# Patient Record
Sex: FEMALE | Race: WHITE | NOT HISPANIC OR LATINO | URBAN - METROPOLITAN AREA
[De-identification: names, ages, dates, MRNs, and addresses within clinical notes are randomized per-mention and may not be internally consistent; named-entity substitution may affect disease eponyms.]

---

## 2017-01-13 ENCOUNTER — EMERGENCY (EMERGENCY)
Facility: HOSPITAL | Age: 77
LOS: 0 days | Discharge: HOME | End: 2017-01-13
Admitting: EMERGENCY MEDICINE
Payer: MEDICARE

## 2017-01-13 DIAGNOSIS — E78.5 HYPERLIPIDEMIA, UNSPECIFIED: ICD-10-CM

## 2017-01-13 DIAGNOSIS — N81.89 OTHER FEMALE GENITAL PROLAPSE: ICD-10-CM

## 2017-01-13 DIAGNOSIS — R30.0 DYSURIA: ICD-10-CM

## 2017-01-13 DIAGNOSIS — N39.0 URINARY TRACT INFECTION, SITE NOT SPECIFIED: ICD-10-CM

## 2017-01-13 DIAGNOSIS — N81.4 UTEROVAGINAL PROLAPSE, UNSPECIFIED: ICD-10-CM

## 2017-01-13 DIAGNOSIS — R10.9 UNSPECIFIED ABDOMINAL PAIN: ICD-10-CM

## 2017-01-13 PROCEDURE — 99285 EMERGENCY DEPT VISIT HI MDM: CPT

## 2017-01-14 ENCOUNTER — EMERGENCY (EMERGENCY)
Facility: HOSPITAL | Age: 77
LOS: 0 days | Discharge: HOME | End: 2017-01-14
Admitting: EMERGENCY MEDICINE
Payer: MEDICARE

## 2017-01-14 DIAGNOSIS — T83.028A DISPLACEMENT OF OTHER URINARY CATHETER, INITIAL ENCOUNTER: ICD-10-CM

## 2017-01-14 DIAGNOSIS — Z79.82 LONG TERM (CURRENT) USE OF ASPIRIN: ICD-10-CM

## 2017-01-14 DIAGNOSIS — N81.4 UTEROVAGINAL PROLAPSE, UNSPECIFIED: ICD-10-CM
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
## 2017-01-14 DIAGNOSIS — E78.5 HYPERLIPIDEMIA, UNSPECIFIED: ICD-10-CM
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
## 2017-01-14 PROCEDURE — 99284 EMERGENCY DEPT VISIT MOD MDM: CPT | Mod: 25

## 2017-01-15 ENCOUNTER — INPATIENT (INPATIENT)
Facility: HOSPITAL | Age: 77
LOS: 0 days | Discharge: HOME | End: 2017-01-16
Attending: OBSTETRICS & GYNECOLOGY | Admitting: OBSTETRICS & GYNECOLOGY
Payer: MEDICARE

## 2017-01-15 PROCEDURE — 99285 EMERGENCY DEPT VISIT HI MDM: CPT

## 2017-01-19 DIAGNOSIS — N81.3 COMPLETE UTEROVAGINAL PROLAPSE: ICD-10-CM

## 2017-01-23 ENCOUNTER — EMERGENCY (EMERGENCY)
Facility: HOSPITAL | Age: 77
LOS: 0 days | Discharge: HOME | End: 2017-01-23
Admitting: EMERGENCY MEDICINE
Payer: MEDICARE

## 2017-01-23 DIAGNOSIS — M54.9 DORSALGIA, UNSPECIFIED: ICD-10-CM

## 2017-01-23 PROCEDURE — 99284 EMERGENCY DEPT VISIT MOD MDM: CPT

## 2017-01-23 PROCEDURE — 74176 CT ABD & PELVIS W/O CONTRAST: CPT | Mod: 26

## 2017-02-08 ENCOUNTER — OUTPATIENT (OUTPATIENT)
Dept: OUTPATIENT SERVICES | Facility: HOSPITAL | Age: 77
LOS: 1 days | Discharge: HOME | End: 2017-02-08

## 2017-02-08 VITALS
RESPIRATION RATE: 20 BRPM | WEIGHT: 139.99 LBS | TEMPERATURE: 98 F | OXYGEN SATURATION: 96 % | HEART RATE: 100 BPM | SYSTOLIC BLOOD PRESSURE: 128 MMHG | DIASTOLIC BLOOD PRESSURE: 70 MMHG | HEIGHT: 62 IN

## 2017-02-08 DIAGNOSIS — N81.3 COMPLETE UTEROVAGINAL PROLAPSE: ICD-10-CM

## 2017-02-08 DIAGNOSIS — J90 PLEURAL EFFUSION, NOT ELSEWHERE CLASSIFIED: ICD-10-CM

## 2017-02-08 DIAGNOSIS — I10 ESSENTIAL (PRIMARY) HYPERTENSION: ICD-10-CM

## 2017-02-08 DIAGNOSIS — Z90.89 ACQUIRED ABSENCE OF OTHER ORGANS: Chronic | ICD-10-CM

## 2017-02-08 DIAGNOSIS — G40.909 EPILEPSY, UNSPECIFIED, NOT INTRACTABLE, WITHOUT STATUS EPILEPTICUS: ICD-10-CM

## 2017-02-08 DIAGNOSIS — E78.5 HYPERLIPIDEMIA, UNSPECIFIED: ICD-10-CM

## 2017-02-08 DIAGNOSIS — Z01.818 ENCOUNTER FOR OTHER PREPROCEDURAL EXAMINATION: ICD-10-CM

## 2017-02-08 DIAGNOSIS — F17.210 NICOTINE DEPENDENCE, CIGARETTES, UNCOMPLICATED: ICD-10-CM

## 2017-02-08 DIAGNOSIS — R13.19 OTHER DYSPHAGIA: ICD-10-CM

## 2017-02-08 LAB
ANION GAP SERPL CALC-SCNC: 8 MMOL/L — SIGNIFICANT CHANGE UP (ref 5–17)
APPEARANCE UR: CLEAR — SIGNIFICANT CHANGE UP
BACTERIA # UR AUTO: (no result)
BASOPHILS # BLD AUTO: 0.2 K/UL — SIGNIFICANT CHANGE UP (ref 0–0.2)
BILIRUB UR-MCNC: (no result)
BLD GP AB SCN SERPL QL: SIGNIFICANT CHANGE UP
BUN SERPL-MCNC: 22 MG/DL — SIGNIFICANT CHANGE UP (ref 7–23)
CALCIUM SERPL-MCNC: 9.3 MG/DL — SIGNIFICANT CHANGE UP (ref 8.5–10.1)
CHLORIDE SERPL-SCNC: 104 MMOL/L — SIGNIFICANT CHANGE UP (ref 96–108)
CO2 SERPL-SCNC: 27 MMOL/L — SIGNIFICANT CHANGE UP (ref 22–31)
COD CRY URNS QL: (no result)
COLOR SPEC: YELLOW — SIGNIFICANT CHANGE UP
CREAT SERPL-MCNC: 0.73 MG/DL — SIGNIFICANT CHANGE UP (ref 0.5–1.3)
DIFF PNL FLD: (no result)
EOSINOPHIL # BLD AUTO: 0.1 K/UL — SIGNIFICANT CHANGE UP (ref 0–0.5)
EPI CELLS # UR: SIGNIFICANT CHANGE UP
GLUCOSE SERPL-MCNC: 99 MG/DL — SIGNIFICANT CHANGE UP (ref 70–99)
GLUCOSE UR QL: NEGATIVE MG/DL — SIGNIFICANT CHANGE UP
HCT VFR BLD CALC: 41.3 % — SIGNIFICANT CHANGE UP (ref 34.5–45)
HGB BLD-MCNC: 13.8 G/DL — SIGNIFICANT CHANGE UP (ref 11.5–15.5)
KETONES UR-MCNC: (no result)
LEUKOCYTE ESTERASE UR-ACNC: (no result)
LYMPHOCYTES # BLD AUTO: 3 K/UL — SIGNIFICANT CHANGE UP (ref 1–3.3)
LYMPHOCYTES # BLD AUTO: 34 % — SIGNIFICANT CHANGE UP (ref 13–44)
MANUAL DIF COMMENT BLD-IMP: SIGNIFICANT CHANGE UP
MCHC RBC-ENTMCNC: 30.6 PG — SIGNIFICANT CHANGE UP (ref 27–34)
MCHC RBC-ENTMCNC: 33.4 GM/DL — SIGNIFICANT CHANGE UP (ref 32–36)
MCV RBC AUTO: 91.6 FL — SIGNIFICANT CHANGE UP (ref 80–100)
MONOCYTES # BLD AUTO: 0.8 K/UL — SIGNIFICANT CHANGE UP (ref 0–0.9)
MONOCYTES NFR BLD AUTO: 8 % — SIGNIFICANT CHANGE UP (ref 2–14)
NEUTROPHILS # BLD AUTO: 6.2 K/UL — SIGNIFICANT CHANGE UP (ref 1.8–7.4)
NEUTROPHILS NFR BLD AUTO: 54 % — SIGNIFICANT CHANGE UP (ref 43–77)
NITRITE UR-MCNC: NEGATIVE — SIGNIFICANT CHANGE UP
PH UR: 5 — SIGNIFICANT CHANGE UP (ref 4.8–8)
PLAT MORPH BLD: NORMAL — SIGNIFICANT CHANGE UP
PLATELET # BLD AUTO: 514 K/UL — HIGH (ref 150–400)
POTASSIUM SERPL-MCNC: 3.7 MMOL/L — SIGNIFICANT CHANGE UP (ref 3.5–5.3)
POTASSIUM SERPL-SCNC: 3.7 MMOL/L — SIGNIFICANT CHANGE UP (ref 3.5–5.3)
PROT UR-MCNC: 30 MG/DL
RBC # BLD: 4.51 M/UL — SIGNIFICANT CHANGE UP (ref 3.8–5.2)
RBC # FLD: 13.3 % — SIGNIFICANT CHANGE UP (ref 10.3–14.5)
RBC BLD AUTO: SIGNIFICANT CHANGE UP
RBC CASTS # UR COMP ASSIST: (no result) /HPF (ref 0–4)
SODIUM SERPL-SCNC: 139 MMOL/L — SIGNIFICANT CHANGE UP (ref 135–145)
SP GR SPEC: 1.02 — SIGNIFICANT CHANGE UP (ref 1.01–1.02)
TYPE + AB SCN PNL BLD: SIGNIFICANT CHANGE UP
UROBILINOGEN FLD QL: 4 MG/DL
VARIANT LYMPHS # BLD: 4 % — SIGNIFICANT CHANGE UP (ref 0–6)
WBC # BLD: 10.4 K/UL — SIGNIFICANT CHANGE UP (ref 3.8–10.5)
WBC # FLD AUTO: 10.4 K/UL — SIGNIFICANT CHANGE UP (ref 3.8–10.5)
WBC UR QL: (no result)

## 2017-02-08 NOTE — H&P PST ADULT - ASSESSMENT
77 y/o female with complete prolapse. Scheduled for Robotic assisted single site sacrocolpopexy, sub urethral sling with MELISA Mattson.     Plan  1. Stop all NSAIDS, herbal supplements and vitamins for 7 days.  2. NPO at midnight.  3. Take the following medications(losartan, lamotrigine) with small sips of water on the morning of your procedure/surgery.  4. Use EZ sponges as directed  5. Use mupirocin as directed

## 2017-02-08 NOTE — H&P PST ADULT - FAMILY HISTORY
Father  Still living? Unknown  Family history of cancer, Age at diagnosis: Age Unknown     Mother  Still living? Unknown  Family history of cancer, Age at diagnosis: Age Unknown     Sibling  Still living? Unknown  Family history of cancer, Age at diagnosis: Age Unknown

## 2017-02-08 NOTE — H&P PST ADULT - PMH
Epilepsy    HTN (hypertension)    Hypercholesteremia    Obesity    Osteoarthritis    Prolapsed uterus  complete prolapse

## 2017-02-08 NOTE — H&P PST ADULT - NSANTHOSAYNRD_GEN_A_CORE
No. THOMAS screening performed.  STOP BANG Legend: 0-2 = LOW Risk; 3-4 = INTERMEDIATE Risk; 5-8 = HIGH Risk

## 2017-02-08 NOTE — H&P PST ADULT - HISTORY OF PRESENT ILLNESS
77 y/o female with complete prolapse. Complain of uterus coming out of vaginal opening. Denies urine stress incontinence. Here today for PST for Robot assisted single site sacrocolpopexy, sub urethral sling.

## 2017-02-13 ENCOUNTER — RESULT REVIEW (OUTPATIENT)
Age: 77
End: 2017-02-13

## 2017-02-13 RX ORDER — FENTANYL CITRATE 50 UG/ML
50 INJECTION INTRAVENOUS
Qty: 0 | Refills: 0 | Status: DISCONTINUED | OUTPATIENT
Start: 2017-02-14 | End: 2017-02-15

## 2017-02-13 RX ORDER — SODIUM CHLORIDE 9 MG/ML
1000 INJECTION, SOLUTION INTRAVENOUS
Qty: 0 | Refills: 0 | Status: DISCONTINUED | OUTPATIENT
Start: 2017-02-14 | End: 2017-02-15

## 2017-02-13 RX ORDER — ONDANSETRON 8 MG/1
4 TABLET, FILM COATED ORAL ONCE
Qty: 0 | Refills: 0 | Status: DISCONTINUED | OUTPATIENT
Start: 2017-02-14 | End: 2017-02-15

## 2017-02-14 ENCOUNTER — INPATIENT (INPATIENT)
Facility: HOSPITAL | Age: 77
LOS: 1 days | Discharge: HOME | End: 2017-02-16
Attending: OBSTETRICS & GYNECOLOGY | Admitting: OBSTETRICS & GYNECOLOGY
Payer: MEDICARE

## 2017-02-14 VITALS
HEIGHT: 62 IN | WEIGHT: 145.28 LBS | OXYGEN SATURATION: 96 % | DIASTOLIC BLOOD PRESSURE: 80 MMHG | HEART RATE: 104 BPM | RESPIRATION RATE: 20 BRPM | SYSTOLIC BLOOD PRESSURE: 134 MMHG | TEMPERATURE: 98 F

## 2017-02-14 DIAGNOSIS — Z90.89 ACQUIRED ABSENCE OF OTHER ORGANS: Chronic | ICD-10-CM

## 2017-02-14 LAB
HCT VFR BLD CALC: 35.8 % — SIGNIFICANT CHANGE UP (ref 34.5–45)
HGB BLD-MCNC: 11.8 G/DL — SIGNIFICANT CHANGE UP (ref 11.5–15.5)

## 2017-02-14 PROCEDURE — 88307 TISSUE EXAM BY PATHOLOGIST: CPT | Mod: 26

## 2017-02-14 RX ORDER — ONDANSETRON 8 MG/1
4 TABLET, FILM COATED ORAL EVERY 6 HOURS
Qty: 0 | Refills: 0 | Status: DISCONTINUED | OUTPATIENT
Start: 2017-02-14 | End: 2017-02-16

## 2017-02-14 RX ORDER — IPRATROPIUM/ALBUTEROL SULFATE 18-103MCG
3 AEROSOL WITH ADAPTER (GRAM) INHALATION ONCE
Qty: 0 | Refills: 0 | Status: COMPLETED | OUTPATIENT
Start: 2017-02-14 | End: 2017-02-14

## 2017-02-14 RX ORDER — SODIUM CHLORIDE 9 MG/ML
1000 INJECTION, SOLUTION INTRAVENOUS
Qty: 0 | Refills: 0 | Status: DISCONTINUED | OUTPATIENT
Start: 2017-02-14 | End: 2017-02-16

## 2017-02-14 RX ORDER — IBUPROFEN 200 MG
400 TABLET ORAL EVERY 6 HOURS
Qty: 0 | Refills: 0 | Status: DISCONTINUED | OUTPATIENT
Start: 2017-02-14 | End: 2017-02-16

## 2017-02-14 RX ADMIN — SODIUM CHLORIDE 75 MILLILITER(S): 9 INJECTION, SOLUTION INTRAVENOUS at 13:54

## 2017-02-14 RX ADMIN — FENTANYL CITRATE 50 MICROGRAM(S): 50 INJECTION INTRAVENOUS at 13:53

## 2017-02-14 RX ADMIN — FENTANYL CITRATE 50 MICROGRAM(S): 50 INJECTION INTRAVENOUS at 13:22

## 2017-02-14 RX ADMIN — Medication 3 MILLILITER(S): at 13:12

## 2017-02-14 RX ADMIN — FENTANYL CITRATE 50 MICROGRAM(S): 50 INJECTION INTRAVENOUS at 13:09

## 2017-02-14 NOTE — BRIEF OPERATIVE NOTE - PROCEDURE
Cystoscopy  02/14/2017    Active  CORRINA  Perineoplasty, nonobstetrical  02/14/2017    Active  CORRINA  TVT (tension free vaginal tape) sling  02/14/2017  TVT- ABBrevo  Active  CORRINA  Sacrocolpopexy using mesh  02/14/2017    Active  CORRINA  Bilateral salpingo-oophorectomy  02/14/2017    Active  CORRINA  Supracervical hysterectomy  02/14/2017    Active  CORRINA  Robotic single site laparoscopy  02/14/2017    Active  JRWAGNER

## 2017-02-15 DIAGNOSIS — J90 PLEURAL EFFUSION, NOT ELSEWHERE CLASSIFIED: ICD-10-CM

## 2017-02-15 DIAGNOSIS — D72.829 ELEVATED WHITE BLOOD CELL COUNT, UNSPECIFIED: ICD-10-CM

## 2017-02-15 DIAGNOSIS — I15.9 SECONDARY HYPERTENSION, UNSPECIFIED: ICD-10-CM

## 2017-02-15 DIAGNOSIS — N81.4 UTEROVAGINAL PROLAPSE, UNSPECIFIED: ICD-10-CM

## 2017-02-15 DIAGNOSIS — R09.89 OTHER SPECIFIED SYMPTOMS AND SIGNS INVOLVING THE CIRCULATORY AND RESPIRATORY SYSTEMS: ICD-10-CM

## 2017-02-15 DIAGNOSIS — E78.00 PURE HYPERCHOLESTEROLEMIA, UNSPECIFIED: ICD-10-CM

## 2017-02-15 DIAGNOSIS — G40.909 EPILEPSY, UNSPECIFIED, NOT INTRACTABLE, WITHOUT STATUS EPILEPTICUS: ICD-10-CM

## 2017-02-15 DIAGNOSIS — R09.02 HYPOXEMIA: ICD-10-CM

## 2017-02-15 LAB
ADD ON TEST-SPECIMEN IN LAB: SIGNIFICANT CHANGE UP
ANION GAP SERPL CALC-SCNC: 11 MMOL/L — SIGNIFICANT CHANGE UP (ref 5–17)
ANION GAP SERPL CALC-SCNC: 9 MMOL/L — SIGNIFICANT CHANGE UP (ref 5–17)
BASOPHILS # BLD AUTO: 0.2 K/UL — SIGNIFICANT CHANGE UP (ref 0–0.2)
BASOPHILS # BLD AUTO: 0.2 K/UL — SIGNIFICANT CHANGE UP (ref 0–0.2)
BASOPHILS NFR BLD AUTO: 1.4 % — SIGNIFICANT CHANGE UP (ref 0–2)
BASOPHILS NFR BLD AUTO: 1.5 % — SIGNIFICANT CHANGE UP (ref 0–2)
BUN SERPL-MCNC: 14 MG/DL — SIGNIFICANT CHANGE UP (ref 7–23)
BUN SERPL-MCNC: 14 MG/DL — SIGNIFICANT CHANGE UP (ref 7–23)
CALCIUM SERPL-MCNC: 8.7 MG/DL — SIGNIFICANT CHANGE UP (ref 8.5–10.1)
CALCIUM SERPL-MCNC: 9.1 MG/DL — SIGNIFICANT CHANGE UP (ref 8.5–10.1)
CHLORIDE SERPL-SCNC: 108 MMOL/L — SIGNIFICANT CHANGE UP (ref 96–108)
CHLORIDE SERPL-SCNC: 110 MMOL/L — HIGH (ref 96–108)
CO2 SERPL-SCNC: 21 MMOL/L — LOW (ref 22–31)
CO2 SERPL-SCNC: 24 MMOL/L — SIGNIFICANT CHANGE UP (ref 22–31)
CREAT SERPL-MCNC: 0.56 MG/DL — SIGNIFICANT CHANGE UP (ref 0.5–1.3)
CREAT SERPL-MCNC: 0.7 MG/DL — SIGNIFICANT CHANGE UP (ref 0.5–1.3)
EOSINOPHIL # BLD AUTO: 0 K/UL — SIGNIFICANT CHANGE UP (ref 0–0.5)
EOSINOPHIL # BLD AUTO: 0 K/UL — SIGNIFICANT CHANGE UP (ref 0–0.5)
EOSINOPHIL NFR BLD AUTO: 0.2 % — SIGNIFICANT CHANGE UP (ref 0–6)
EOSINOPHIL NFR BLD AUTO: 0.3 % — SIGNIFICANT CHANGE UP (ref 0–6)
GLUCOSE SERPL-MCNC: 93 MG/DL — SIGNIFICANT CHANGE UP (ref 70–99)
GLUCOSE SERPL-MCNC: 95 MG/DL — SIGNIFICANT CHANGE UP (ref 70–99)
HCT VFR BLD CALC: 37 % — SIGNIFICANT CHANGE UP (ref 34.5–45)
HCT VFR BLD CALC: 38.1 % — SIGNIFICANT CHANGE UP (ref 34.5–45)
HGB BLD-MCNC: 12.1 G/DL — SIGNIFICANT CHANGE UP (ref 11.5–15.5)
HGB BLD-MCNC: 12.5 G/DL — SIGNIFICANT CHANGE UP (ref 11.5–15.5)
LYMPHOCYTES # BLD AUTO: 16.8 % — SIGNIFICANT CHANGE UP (ref 13–44)
LYMPHOCYTES # BLD AUTO: 18.9 % — SIGNIFICANT CHANGE UP (ref 13–44)
LYMPHOCYTES # BLD AUTO: 2.1 K/UL — SIGNIFICANT CHANGE UP (ref 1–3.3)
LYMPHOCYTES # BLD AUTO: 2.3 K/UL — SIGNIFICANT CHANGE UP (ref 1–3.3)
MCHC RBC-ENTMCNC: 29.8 PG — SIGNIFICANT CHANGE UP (ref 27–34)
MCHC RBC-ENTMCNC: 30 PG — SIGNIFICANT CHANGE UP (ref 27–34)
MCHC RBC-ENTMCNC: 32.7 GM/DL — SIGNIFICANT CHANGE UP (ref 32–36)
MCHC RBC-ENTMCNC: 32.8 GM/DL — SIGNIFICANT CHANGE UP (ref 32–36)
MCV RBC AUTO: 91 FL — SIGNIFICANT CHANGE UP (ref 80–100)
MCV RBC AUTO: 91.7 FL — SIGNIFICANT CHANGE UP (ref 80–100)
MONOCYTES # BLD AUTO: 1 K/UL — HIGH (ref 0–0.9)
MONOCYTES # BLD AUTO: 1.1 K/UL — HIGH (ref 0–0.9)
MONOCYTES NFR BLD AUTO: 8 % — SIGNIFICANT CHANGE UP (ref 2–14)
MONOCYTES NFR BLD AUTO: 8.7 % — SIGNIFICANT CHANGE UP (ref 2–14)
NEUTROPHILS # BLD AUTO: 8.6 K/UL — HIGH (ref 1.8–7.4)
NEUTROPHILS # BLD AUTO: 8.9 K/UL — HIGH (ref 1.8–7.4)
NEUTROPHILS NFR BLD AUTO: 71.5 % — SIGNIFICANT CHANGE UP (ref 43–77)
NEUTROPHILS NFR BLD AUTO: 72.8 % — SIGNIFICANT CHANGE UP (ref 43–77)
PLATELET # BLD AUTO: 382 K/UL — SIGNIFICANT CHANGE UP (ref 150–400)
PLATELET # BLD AUTO: 407 K/UL — HIGH (ref 150–400)
POTASSIUM SERPL-MCNC: 3.7 MMOL/L — SIGNIFICANT CHANGE UP (ref 3.5–5.3)
POTASSIUM SERPL-MCNC: 4.3 MMOL/L — SIGNIFICANT CHANGE UP (ref 3.5–5.3)
POTASSIUM SERPL-SCNC: 3.7 MMOL/L — SIGNIFICANT CHANGE UP (ref 3.5–5.3)
POTASSIUM SERPL-SCNC: 4.3 MMOL/L — SIGNIFICANT CHANGE UP (ref 3.5–5.3)
RBC # BLD: 4.04 M/UL — SIGNIFICANT CHANGE UP (ref 3.8–5.2)
RBC # BLD: 4.19 M/UL — SIGNIFICANT CHANGE UP (ref 3.8–5.2)
RBC # FLD: 13.4 % — SIGNIFICANT CHANGE UP (ref 10.3–14.5)
RBC # FLD: 13.6 % — SIGNIFICANT CHANGE UP (ref 10.3–14.5)
SODIUM SERPL-SCNC: 141 MMOL/L — SIGNIFICANT CHANGE UP (ref 135–145)
SODIUM SERPL-SCNC: 142 MMOL/L — SIGNIFICANT CHANGE UP (ref 135–145)
TROPONIN I SERPL-MCNC: <0.015 NG/ML — SIGNIFICANT CHANGE UP (ref 0.01–0.04)
WBC # BLD: 12 K/UL — HIGH (ref 3.8–10.5)
WBC # BLD: 12.3 K/UL — HIGH (ref 3.8–10.5)
WBC # FLD AUTO: 12 K/UL — HIGH (ref 3.8–10.5)
WBC # FLD AUTO: 12.3 K/UL — HIGH (ref 3.8–10.5)

## 2017-02-15 PROCEDURE — 71010: CPT | Mod: 26

## 2017-02-15 PROCEDURE — 93010 ELECTROCARDIOGRAM REPORT: CPT

## 2017-02-15 RX ORDER — KETOROLAC TROMETHAMINE 30 MG/ML
30 SYRINGE (ML) INJECTION EVERY 6 HOURS
Qty: 0 | Refills: 0 | Status: DISCONTINUED | OUTPATIENT
Start: 2017-02-15 | End: 2017-02-16

## 2017-02-15 RX ORDER — OXYCODONE HYDROCHLORIDE 5 MG/1
1 TABLET ORAL
Qty: 10 | Refills: 0 | OUTPATIENT
Start: 2017-02-15 | End: 2017-02-21

## 2017-02-15 RX ORDER — FUROSEMIDE 40 MG
20 TABLET ORAL ONCE
Qty: 0 | Refills: 0 | Status: COMPLETED | OUTPATIENT
Start: 2017-02-15 | End: 2017-02-15

## 2017-02-15 RX ORDER — AZITHROMYCIN 500 MG/1
1 TABLET, FILM COATED ORAL
Qty: 6 | Refills: 0
Start: 2017-02-15 | End: 2017-02-20

## 2017-02-15 RX ORDER — SODIUM CHLORIDE 9 MG/ML
1000 INJECTION, SOLUTION INTRAVENOUS
Qty: 0 | Refills: 0 | Status: DISCONTINUED | OUTPATIENT
Start: 2017-02-15 | End: 2017-02-16

## 2017-02-15 RX ADMIN — Medication 30 MILLIGRAM(S): at 17:40

## 2017-02-15 RX ADMIN — Medication 400 MILLIGRAM(S): at 06:02

## 2017-02-15 RX ADMIN — Medication 30 MILLIGRAM(S): at 17:37

## 2017-02-15 RX ADMIN — SODIUM CHLORIDE 75 MILLILITER(S): 9 INJECTION, SOLUTION INTRAVENOUS at 22:29

## 2017-02-15 RX ADMIN — Medication 400 MILLIGRAM(S): at 00:04

## 2017-02-15 RX ADMIN — SODIUM CHLORIDE 100 MILLILITER(S): 9 INJECTION, SOLUTION INTRAVENOUS at 14:20

## 2017-02-15 RX ADMIN — Medication 20 MILLIGRAM(S): at 10:55

## 2017-02-15 NOTE — PROGRESS NOTE ADULT - ASSESSMENT
POD#1 s/p robotic hyst, BSO, cystoscopy with desaturation, concern for possible aspiration  -not in acute respiratory distress

## 2017-02-15 NOTE — CONSULT NOTE ADULT - PROBLEM SELECTOR RECOMMENDATION 9
Acute hypoxia  Likely multifactorial related to small pleural effusions in the setting of choking episode  Chest xray (2/15) results noted: Tiny bilateral pl effusions underlying atelectasis and/or infiltrate are present.  continue with O2 supplementation   Lasix 20mg PO ordered x 1  Troponin negative   Awaiting speech/swallow eval Acute hypoxia  Likely multifactorial related to small pleural effusions in the setting of choking episode.  Rule out aspiration.    Chest xray (2/15) results noted: Tiny bilateral pl effusions underlying atelectasis and/or infiltrate are present.  continue with O2 supplementation   Lasix 20mg PO ordered x 1  Troponin negative   Currently afebrile; but low threshold for initiation of antibiotics   Awaiting speech/swallow eval

## 2017-02-15 NOTE — SWALLOW BEDSIDE ASSESSMENT ADULT - PHARYNGEAL PHASE
TImely onset of swallow with palpable laryngeal lift, audible to cervical auscultation: then very shortly  therafter, on almost every trial of all consistencies, bolus could be auscultated as if IN BACKFLOW.  On ecvery instance, Pt senied swallowing, "Can you hear it? It's stuck".  Then pt had three instances of outright vomt\iting and unable to bring material fully into mouth consistenctly, with complaints of  difficulty breathing afterwards.  NO S/S OUTRIGHT PRIMARY CHOKING or post prandial cough./Within functional limits

## 2017-02-15 NOTE — CONSULT NOTE ADULT - PROBLEM SELECTOR RECOMMENDATION 5
BP optimal  Home medication losartan currently on hold History of epilepsy   On home med lamictal 100mg twice daily

## 2017-02-15 NOTE — SWALLOW BEDSIDE ASSESSMENT ADULT - COMMENTS
pt seated in bed: awake and alert but anxious because of  'swallowing difficulty:: c/o of sensation of food stuck in her throat, pointing to the basse of her throat and the side  in verification.  verbal and appropriate: no evidence of primary linguistic pathology.

## 2017-02-15 NOTE — CONSULT NOTE ADULT - PROBLEM SELECTOR RECOMMENDATION 3
Speech/swallow evaluation requested Etiology is unclear  ? Sympathetic response related to surgery  Pt is currently afebrile.  Low threshold for initiation of abx in the setting of hypoxia/choking . Rule out aspiration

## 2017-02-15 NOTE — CONSULT NOTE ADULT - SUBJECTIVE AND OBJECTIVE BOX
Called to bedside by RN due to low O2 sat. Patient is POD#1 s/p robotic single site laparoscopy, TIMO, BSO, cystoscopy. When tried to eat this morning, felt a choking sensation, vomited, now feels like "something is stuck" in her throat.  O2 sat found to 86-87% on RA.    VS: BP 120s/90s, HR 97, afebrile, O2 sat improved to 92% on O2 via NC.    GEN: NAD  CVS: S1S2, RRR  Lungs: loud crackles in upper regions bilaterally, clear in lower regions      Procedures:  Cystoscopy  02/14/2017    Active  CORRINA  Perineoplasty, nonobstetrical  02/14/2017    Active  JRWAGNER  TVT (tension free vaginal tape) sling  02/14/2017  TVT- ABBrevo  Active  CORRINA  Sacrocolpopexy using mesh  02/14/2017    Active  JRWAGNER  Bilateral salpingo-oophorectomy  02/14/2017    Active  CORRINA  Supracervical hysterectomy  02/14/2017    Active  JRWAGNER  Robotic single site laparoscopy  02/14/2017    Active  JRWAGNER HPI  76 year old female with a PMHx HTN, HLD, epilepsy, OA, obesity, who is s/p robotic hysterectomy, BSO, and cystoscopy on 2/14/2017.  Pt tolerated the procedure well with no complications.  This morning when she tried to eat she felt a choking sensation, vomited, now feels like "something is stuck" in her throat.  O2 sats found to be 86-87% RA-->92% on 2 L NC.  Pt reports mild dyspnea.  Hospitalist was consulted.  EKG NSR no acute changes.    Chest xray: Tiny bilateral pl effusions underlying atelectasis and/or infiltrate are present.  Lasix 20mg PO x 1 ordered     PMHx  HTN  HLD  Epilepsy  Obesity    PSHx  S/p robotic single site laparoscopy, hysterectomy, BSO, cystoscopy 2/14/2017  Prolapsed uterus  Tonsillectomy     Family History  Notable for hypertension    Social History   Pt lives at home with family  Non-smoker and non-drinker    Allergies  NKDA    ROS  10 systems reviewed in detail and are negative except what was mentioned in HPI     Medications  As per medication reconciliation sheet    PHYSICAL EXAM:  T(C): 37.1, Max: 37.4 (02-15 @ 00:30)  HR: 92 (80 - 104)  BP: 115/78 (57/- - 147/70)  RR: 16 (11 - 20)  SpO2: 91% (86% - 954%)  Wt(kg): --   GENERAL: NAD, well-groomed, well-developed  HEAD:  Atraumatic, Normocephalic  EYES: EOMI, PERRLA, conjunctiva and sclera clear  ENMT: Moist mucous membranes  NECK: Supple, No JVD  NERVOUS SYSTEM:  Alert & Oriented X3, Motor Strength 5/5 B/L upper and lower extremities; DTRs 2+ intact and symmetric  CHEST/LUNG: Good effort; crackles noted mostly on the lower lung fields   HEART: Regular rate and rhythm; No murmurs, rubs, or gallops  ABDOMEN: Soft, Nontender, Nondistended; Bowel sounds present  EXTREMITIES:  2+ Peripheral Pulses, No clubbing, cyanosis, or edema      LABS:                        12.1   12.3  )-----------( 407      ( 15 Feb 2017 09:24 )             37.0     15 Feb 2017 09:24    141    |  108    |  14     ----------------------------<  93     3.7     |  24     |  0.56     Ca    8.7        15 Feb 2017 09:24    EKG NSR no acute ST changes    Chest xray:   IMPRESSION:    Tiny bilateral pleural effusions underlying atelectasis and/or infiltrate   are present. Cardiomediastinal silhouette is intact.

## 2017-02-15 NOTE — CONSULT NOTE ADULT - PROBLEM SELECTOR RECOMMENDATION 4
History of epilepsy   On home med lamictal 100mg twice daily Etiology is unclear  ? post-op related sympathetic response  Pt is currently afebrile.  Low threshold for initiation of abx in the setting of hypoxia/choking . Rule out aspiration Speech/swallow evaluation requested

## 2017-02-15 NOTE — CONSULT NOTE ADULT - ASSESSMENT
POD#1 s/p robotic hyst, BSO, cystoscopy with desaturation, concern for possible aspiration  -not in acute respiratory distress 76 year old female with a PMHx HTN, HLD, epilepsy, OA, obesity, who is s/p robotic hysterectomy, BSO, and cystoscopy on 2/14/2017.  Pt tolerated the procedure well with no complications.  This morning when she tried to eat she felt a choking sensation, vomited, now feels like "something is stuck" in her throat.  O2 sats found to be 86-87% RA-->92% on 2 L NC.  Pt reports mild dyspnea.  Hospitalist was consulted.  EKG NSR no acute changes.    Chest xray: Tiny bilateral pl effusions underlying atelectasis and/or infiltrate are present.  Lasix 20mg PO x 1 ordered

## 2017-02-15 NOTE — SWALLOW BEDSIDE ASSESSMENT ADULT - SWALLOW EVAL: RECOMMENDED DIET
NPO at this time: pt able to swallow but not keep bolus down: ice chips as desired and feasible : oral hygiene and mouth care while NPO.

## 2017-02-15 NOTE — PROGRESS NOTE ADULT - SUBJECTIVE AND OBJECTIVE BOX
Called to bedside by RN due to low O2 sat. Patient is POD#1 s/p robotic single site laparoscopy, TIMO, BSO, cystoscopy. When tried to eat this morning, felt a choking sensation, vomited, now feels like "something is stuck" in her throat.  O2 sat found to 86-87% on RA.    VS: BP 120s/90s, HR 97, afebrile, O2 sat improved to 92% on O2 via NC.    GEN: NAD  CVS: S1S2, RRR  Lungs: loud crackles in upper regions bilaterally, clear in lower regions      Procedures:  Cystoscopy  02/14/2017    Active  CORRINA  Perineoplasty, nonobstetrical  02/14/2017    Active  JRWAGNER  TVT (tension free vaginal tape) sling  02/14/2017  TVT- ABBrevo  Active  CORRINA  Sacrocolpopexy using mesh  02/14/2017    Active  JRWAGNER  Bilateral salpingo-oophorectomy  02/14/2017    Active  CORRINA  Supracervical hysterectomy  02/14/2017    Active  JRWAGNER  Robotic single site laparoscopy  02/14/2017    Active  JRWAGNER

## 2017-02-15 NOTE — PROGRESS NOTE ADULT - SUBJECTIVE AND OBJECTIVE BOX
Postoperative day:1  surgery: FE LSHBSO, sacrocolpopexy; TVT-Abbrevo, perineoplasty  patient resting comfortably; UOOB resting in chair  complaints: none  OR findings and course d/w patient in detail -- all questions answered  nursing input solicited  Focused ROS: negative    Physical exam:    Vital Signs Last 24 Hrs  T(C): 36.8, Max: 37.4 (02-15 @ 00:30)  T(F): 98.3, Max: 99.3 (02-15 @ 00:30)  HR: 80 (80 - 104)  BP: 131/84 (57/- - 147/70)  BP(mean): --  RR: 16 (11 - 20)  SpO2: 97% (91% - 954%)      Abdomen: Soft,  appropriately tender, non-distended  Incision is clean dry and intact  Vagina: minimal bleeding  Ext: lower extremities symmetric and without calf tenderness    LABS:                        11.8   x     )-----------( x        ( 14 Feb 2017 22:32 )             35.8             Allergies    No Known Allergies    Intolerances          Assessment and Plan  Doing well. Bladder testing today  Tolerating regular diet.  Routine post op care.  Plan discharge home if able to void and pain well controlled- routine restrictions  patient given written and verbal discharge instructions

## 2017-02-15 NOTE — CONSULT NOTE ADULT - PROBLEM SELECTOR RECOMMENDATION 2
Likely related to surgery and mild fluid overload  Chest xray (2/15) results noted: Tiny bilateral pl effusions underlying atelectasis and/or infiltrate are present.  Lasix 20mg PO x 1 given  Troponin negative.

## 2017-02-15 NOTE — CONSULT NOTE ADULT - PROBLEM SELECTOR RECOMMENDATION 6
Etiology is unclear  ? post-op related  Pt is currently afebrile.  Low threshold for initiation of abx in the setting of hypoxia/choking ? aspiration BP optimal  Home medication losartan currently on hold

## 2017-02-15 NOTE — SWALLOW BEDSIDE ASSESSMENT ADULT - NS SPL SWALLOW CLINIC TRIAL FT
At this time, oral-pharyngeal function appears to be within functional limits for regular consistency diet. But clearly, esophageal function is compromised in some way:  LE spasm?  reflux?  (Pt has never had this problem before).   Rx:   1.  keep Pt NPO for now.  2.  ENT and GI consult are recommended;  3.  pt instructed in oral hygiene techniques.   4. Ice chips as tolerated and desired.    Disc with NSg and with MD     Service will follow

## 2017-02-15 NOTE — PROGRESS NOTE ADULT - PROBLEM SELECTOR PLAN 1
Cont O2 via NC  Portable stat chest X ray ordered  Hospitalist consult called, spoke to Dr. Hebert, will see patient  EKG  stat CBC, BMP

## 2017-02-16 VITALS
SYSTOLIC BLOOD PRESSURE: 144 MMHG | OXYGEN SATURATION: 89 % | HEART RATE: 88 BPM | TEMPERATURE: 207 F | DIASTOLIC BLOOD PRESSURE: 75 MMHG | RESPIRATION RATE: 16 BRPM

## 2017-02-16 DIAGNOSIS — R13.10 DYSPHAGIA, UNSPECIFIED: ICD-10-CM

## 2017-02-16 RX ORDER — IBUPROFEN 200 MG
1 TABLET ORAL
Qty: 0 | Refills: 0 | DISCHARGE
Start: 2017-02-16

## 2017-02-16 RX ORDER — LAMOTRIGINE 25 MG/1
1 TABLET, ORALLY DISINTEGRATING ORAL
Qty: 0 | Refills: 0 | COMMUNITY

## 2017-02-16 RX ORDER — LOSARTAN POTASSIUM 100 MG/1
1 TABLET, FILM COATED ORAL
Qty: 0 | Refills: 0 | COMMUNITY

## 2017-02-16 RX ORDER — ASPIRIN/CALCIUM CARB/MAGNESIUM 324 MG
1 TABLET ORAL
Qty: 0 | Refills: 0 | COMMUNITY

## 2017-02-16 RX ADMIN — Medication 30 MILLIGRAM(S): at 01:00

## 2017-02-16 RX ADMIN — Medication 30 MILLIGRAM(S): at 06:48

## 2017-02-16 RX ADMIN — Medication 30 MILLIGRAM(S): at 00:04

## 2017-02-16 RX ADMIN — Medication 30 MILLIGRAM(S): at 06:02

## 2017-02-16 NOTE — DISCHARGE NOTE ADULT - CARE PLAN
Principal Discharge DX:	Prolapsed uterus  Goal:	recovery  Instructions for follow-up, activity and diet:	regular diet; vaginal restrictions only

## 2017-02-16 NOTE — CONSULT NOTE ADULT - SUBJECTIVE AND OBJECTIVE BOX
CC Dysphagia  75 y/o female with Dysphagia after surgery during this hospitalization.speech swallow eval WNL  Dysphagia resolved-Patient tolerating regular diet      PAST MEDICAL & SURGICAL HISTORY:  Prolapsed uterus: complete prolapse  Osteoarthritis  Obesity  Hypercholesteremia  HTN (hypertension)  Epilepsy  S/P tonsillectomy: age 11      MEDICATIONS  (STANDING):  lactated ringers. 1000milliLiter(s) IV Continuous <Continuous>  ibuprofen  Tablet 400milliGRAM(s) Oral every 6 hours  lactated ringers. 1000milliLiter(s) IV Continuous <Continuous>    MEDICATIONS  (PRN):  oxyCODONE  5 mG/acetaminophen 325 mG 1Tablet(s) Oral every 3 hours PRN Moderate Pain  oxyCODONE  5 mG/acetaminophen 325 mG 2Tablet(s) Oral every 3 hours PRN Severe Pain  ondansetron Injectable 4milliGRAM(s) IV Push every 6 hours PRN Nausea      Allergies    No Known Allergies    Intolerances        SOCIAL HISTORY:    FAMILY HISTORY:  Family history of cancer (Father, Mother, Sibling): lung cancer - father and brother  breast cancer - mother   Non-contributory    REVIEW OF SYSTEMS Unremarkable      General:	    Respiratory and Thorax:  	  Cardiovascular:	    Gastrointestinal:	    Musculoskeletal:	   Vital Signs Last 24 Hrs  T(C): 97.4, Max: 97.4 (02-16 @ 07:33)  T(F): 207.3, Max: 207.3 (02-16 @ 07:33)  HR: 88 (88 - 109)  BP: 144/75 (110/53 - 147/78)  BP(mean): --  RR: 16 (16 - 16)  SpO2: 89% (88% - 94%)    HEENT :No Pallor.No icterus. EOMI,PERLAA  Chest : Clear to Auscultation  CVS : S1S2 Normal.No murmurs.  Abdomen: Soft.Non tender .Normal bowel sounds.No Organomegaly.  CNS: Alert.Oriented to Time,Place and Person.No focal deficit.  EXT: Normal Range of motion.No pitting edema.    LABS:                        12.1   12.3  )-----------( 407      ( 15 Feb 2017 09:24 )             37.0     15 Feb 2017 09:24    141    |  108    |  14     ----------------------------<  93     3.7     |  24     |  0.56     Ca    8.7        15 Feb 2017 09:24            RADIOLOGY & ADDITIONAL STUDIES:

## 2017-02-16 NOTE — DISCHARGE NOTE ADULT - CARE PROVIDER_API CALL
Romel Mattson (MD), Obstetrics and Gynecology  [address] [place] [address]  Phone: [phone]  Fax: [phone]

## 2017-02-16 NOTE — DISCHARGE NOTE ADULT - PATIENT PORTAL LINK FT
“You can access the FollowHealth Patient Portal, offered by [facility], by registering with the following website: [email or web address]”

## 2017-02-16 NOTE — DISCHARGE NOTE ADULT - MEDICATION SUMMARY - MEDICATIONS TO TAKE
I will START or STAY ON the medications listed below when I get home from the hospital:    ibuprofen 400 mg oral tablet  -- 1 tab(s) by mouth every 6 hours  -- Indication: For Pain    azithromycin 250 mg oral tablet  -- 1 tab(s) by mouth once a day; first dose two tablets -for wound care  -- Do not take dairy products, antacids, or iron preparations within one hour of this medication.  Finish all this medication unless otherwise directed by prescriber.    -- Indication: For wouund care

## 2017-02-16 NOTE — PROGRESS NOTE ADULT - SUBJECTIVE AND OBJECTIVE BOX
Postoperative day: 2  surgery: [facility] BSO sacrocolpopexy  patient resting comfortably  complaints: none -- anxious for discharge  OR findings and course d/w patient in detail -- all questions answered  nursing input solicited  Focused ROS: negative    Physical exam:    Vital Signs Last 24 Hrs  T(C): 97.4, Max: 97.4 (02-16 @ 07:33)  T(F): 207.3, Max: 207.3 (02-16 @ 07:33)  HR: 88 (88 - 109)  BP: 144/75 (110/53 - 147/78)  BP(mean): --  RR: 16 (16 - 16)  SpO2: 89% (88% - 94%)      Abdomen: Soft,  appropriately tender, non-distended  Incision is clean dry and intact  Vagina: minimal bleeding  Ext: lower extremities symmetric and without calf tenderness    LABS:                        12.1   12.3  )-----------( 407      ( 15 Feb 2017 09:24 )             37.0     15 Feb 2017 09:24    141    |  108    |  14     ----------------------------<  93     3.7     |  24     |  0.56     Ca    8.7        15 Feb 2017 09:24        Allergies    No Known Allergies    Intolerances          Assessment and Plan  Doing well.  Tolerating regular diet.  Routine post op care.  Plan discharge home  --- routine restrictions  patient given written and verbal discharge instructions

## 2017-02-21 DIAGNOSIS — Z86.73 PERSONAL HISTORY OF TRANSIENT ISCHEMIC ATTACK (TIA), AND CEREBRAL INFARCTION WITHOUT RESIDUAL DEFICITS: ICD-10-CM

## 2017-02-21 DIAGNOSIS — G40.909 EPILEPSY, UNSPECIFIED, NOT INTRACTABLE, WITHOUT STATUS EPILEPTICUS: ICD-10-CM

## 2017-02-21 DIAGNOSIS — I10 ESSENTIAL (PRIMARY) HYPERTENSION: ICD-10-CM

## 2017-02-21 DIAGNOSIS — N81.3 COMPLETE UTEROVAGINAL PROLAPSE: ICD-10-CM

## 2017-02-21 DIAGNOSIS — E78.5 HYPERLIPIDEMIA, UNSPECIFIED: ICD-10-CM

## 2017-02-21 DIAGNOSIS — R13.19 OTHER DYSPHAGIA: ICD-10-CM
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
## 2017-02-21 DIAGNOSIS — F17.210 NICOTINE DEPENDENCE, CIGARETTES, UNCOMPLICATED: ICD-10-CM

## 2017-02-21 DIAGNOSIS — S19.89XA OTHER SPECIFIED INJURIES OF OTHER SPECIFIED PART OF NECK, INITIAL ENCOUNTER: ICD-10-CM
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
## 2017-02-21 LAB — SURGICAL PATHOLOGY FINAL REPORT - CH: SIGNIFICANT CHANGE UP

## 2017-02-22 DIAGNOSIS — J90 PLEURAL EFFUSION, NOT ELSEWHERE CLASSIFIED: ICD-10-CM

## 2018-01-05 ENCOUNTER — EMERGENCY (EMERGENCY)
Facility: HOSPITAL | Age: 78
LOS: 0 days | Discharge: HOME | End: 2018-01-05
Attending: EMERGENCY MEDICINE | Admitting: EMERGENCY MEDICINE
Payer: MEDICARE

## 2018-01-05 VITALS
RESPIRATION RATE: 18 BRPM | OXYGEN SATURATION: 98 % | DIASTOLIC BLOOD PRESSURE: 76 MMHG | TEMPERATURE: 98 F | SYSTOLIC BLOOD PRESSURE: 141 MMHG | HEART RATE: 91 BPM

## 2018-01-05 VITALS
SYSTOLIC BLOOD PRESSURE: 144 MMHG | HEIGHT: 62 IN | DIASTOLIC BLOOD PRESSURE: 76 MMHG | HEART RATE: 97 BPM | RESPIRATION RATE: 18 BRPM | OXYGEN SATURATION: 95 % | TEMPERATURE: 99 F | WEIGHT: 145.06 LBS

## 2018-01-05 DIAGNOSIS — M54.6 PAIN IN THORACIC SPINE: ICD-10-CM

## 2018-01-05 DIAGNOSIS — Z90.89 ACQUIRED ABSENCE OF OTHER ORGANS: Chronic | ICD-10-CM

## 2018-01-05 DIAGNOSIS — I10 ESSENTIAL (PRIMARY) HYPERTENSION: ICD-10-CM

## 2018-01-05 DIAGNOSIS — G40.802 OTHER EPILEPSY, NOT INTRACTABLE, WITHOUT STATUS EPILEPTICUS: ICD-10-CM

## 2018-01-05 DIAGNOSIS — E78.5 HYPERLIPIDEMIA, UNSPECIFIED: ICD-10-CM

## 2018-01-05 LAB
ALBUMIN SERPL ELPH-MCNC: 3.9 G/DL — SIGNIFICANT CHANGE UP (ref 3.3–5)
ALP SERPL-CCNC: 115 U/L — SIGNIFICANT CHANGE UP (ref 40–120)
ALT FLD-CCNC: 19 U/L — SIGNIFICANT CHANGE UP (ref 12–78)
ANION GAP SERPL CALC-SCNC: 8 MMOL/L — SIGNIFICANT CHANGE UP (ref 5–17)
APPEARANCE UR: CLEAR — SIGNIFICANT CHANGE UP
APTT BLD: 28.4 SEC — SIGNIFICANT CHANGE UP (ref 27.5–37.4)
AST SERPL-CCNC: 16 U/L — SIGNIFICANT CHANGE UP (ref 15–37)
BACTERIA # UR AUTO: (no result)
BASOPHILS # BLD AUTO: 0.2 K/UL — SIGNIFICANT CHANGE UP (ref 0–0.2)
BASOPHILS NFR BLD AUTO: 1.4 % — SIGNIFICANT CHANGE UP (ref 0–2)
BILIRUB SERPL-MCNC: 0.4 MG/DL — SIGNIFICANT CHANGE UP (ref 0.2–1.2)
BILIRUB UR-MCNC: NEGATIVE — SIGNIFICANT CHANGE UP
BUN SERPL-MCNC: 13 MG/DL — SIGNIFICANT CHANGE UP (ref 7–23)
CALCIUM SERPL-MCNC: 9.3 MG/DL — SIGNIFICANT CHANGE UP (ref 8.5–10.1)
CHLORIDE SERPL-SCNC: 103 MMOL/L — SIGNIFICANT CHANGE UP (ref 96–108)
CK SERPL-CCNC: 57 U/L — SIGNIFICANT CHANGE UP (ref 26–192)
CO2 SERPL-SCNC: 26 MMOL/L — SIGNIFICANT CHANGE UP (ref 22–31)
COLOR SPEC: YELLOW — SIGNIFICANT CHANGE UP
CREAT SERPL-MCNC: 0.63 MG/DL — SIGNIFICANT CHANGE UP (ref 0.5–1.3)
DIFF PNL FLD: (no result)
EOSINOPHIL # BLD AUTO: 0.1 K/UL — SIGNIFICANT CHANGE UP (ref 0–0.5)
EOSINOPHIL NFR BLD AUTO: 0.4 % — SIGNIFICANT CHANGE UP (ref 0–6)
EPI CELLS # UR: (no result)
GLUCOSE SERPL-MCNC: 114 MG/DL — HIGH (ref 70–99)
GLUCOSE UR QL: NEGATIVE MG/DL — SIGNIFICANT CHANGE UP
HCT VFR BLD CALC: 40.5 % — SIGNIFICANT CHANGE UP (ref 34.5–45)
HGB BLD-MCNC: 13 G/DL — SIGNIFICANT CHANGE UP (ref 11.5–15.5)
INR BLD: 0.88 RATIO — SIGNIFICANT CHANGE UP (ref 0.88–1.16)
KETONES UR-MCNC: NEGATIVE — SIGNIFICANT CHANGE UP
LEUKOCYTE ESTERASE UR-ACNC: (no result)
LIDOCAIN IGE QN: 132 U/L — SIGNIFICANT CHANGE UP (ref 73–393)
LYMPHOCYTES # BLD AUTO: 17.9 % — SIGNIFICANT CHANGE UP (ref 13–44)
LYMPHOCYTES # BLD AUTO: 2.5 K/UL — SIGNIFICANT CHANGE UP (ref 1–3.3)
MCHC RBC-ENTMCNC: 29.4 PG — SIGNIFICANT CHANGE UP (ref 27–34)
MCHC RBC-ENTMCNC: 32 GM/DL — SIGNIFICANT CHANGE UP (ref 32–36)
MCV RBC AUTO: 92 FL — SIGNIFICANT CHANGE UP (ref 80–100)
MONOCYTES # BLD AUTO: 0.8 K/UL — SIGNIFICANT CHANGE UP (ref 0–0.9)
MONOCYTES NFR BLD AUTO: 5.4 % — SIGNIFICANT CHANGE UP (ref 2–14)
NEUTROPHILS # BLD AUTO: 10.4 K/UL — HIGH (ref 1.8–7.4)
NEUTROPHILS NFR BLD AUTO: 74.8 % — SIGNIFICANT CHANGE UP (ref 43–77)
NITRITE UR-MCNC: NEGATIVE — SIGNIFICANT CHANGE UP
PH UR: 6 — SIGNIFICANT CHANGE UP (ref 5–8)
PLATELET # BLD AUTO: 465 K/UL — HIGH (ref 150–400)
POTASSIUM SERPL-MCNC: 3.9 MMOL/L — SIGNIFICANT CHANGE UP (ref 3.5–5.3)
POTASSIUM SERPL-SCNC: 3.9 MMOL/L — SIGNIFICANT CHANGE UP (ref 3.5–5.3)
PROT SERPL-MCNC: 8.3 GM/DL — SIGNIFICANT CHANGE UP (ref 6–8.3)
PROT UR-MCNC: 30 MG/DL
PROTHROM AB SERPL-ACNC: 9.5 SEC — LOW (ref 9.8–12.7)
RBC # BLD: 4.4 M/UL — SIGNIFICANT CHANGE UP (ref 3.8–5.2)
RBC # FLD: 13 % — SIGNIFICANT CHANGE UP (ref 10.3–14.5)
RBC CASTS # UR COMP ASSIST: (no result) /HPF (ref 0–4)
SODIUM SERPL-SCNC: 137 MMOL/L — SIGNIFICANT CHANGE UP (ref 135–145)
SP GR SPEC: 1.01 — SIGNIFICANT CHANGE UP (ref 1.01–1.02)
TROPONIN I SERPL-MCNC: <0.015 NG/ML — SIGNIFICANT CHANGE UP (ref 0.01–0.04)
UROBILINOGEN FLD QL: NEGATIVE MG/DL — SIGNIFICANT CHANGE UP
WBC # BLD: 14 K/UL — HIGH (ref 3.8–10.5)
WBC # FLD AUTO: 14 K/UL — HIGH (ref 3.8–10.5)
WBC UR QL: SIGNIFICANT CHANGE UP

## 2018-01-05 PROCEDURE — 99284 EMERGENCY DEPT VISIT MOD MDM: CPT | Mod: 25

## 2018-01-05 PROCEDURE — 71045 X-RAY EXAM CHEST 1 VIEW: CPT | Mod: 26

## 2018-01-05 PROCEDURE — 93010 ELECTROCARDIOGRAM REPORT: CPT

## 2018-01-05 RX ORDER — CYCLOBENZAPRINE HYDROCHLORIDE 10 MG/1
1 TABLET, FILM COATED ORAL
Qty: 15 | Refills: 0
Start: 2018-01-05 | End: 2018-01-09

## 2018-01-05 RX ORDER — SODIUM CHLORIDE 9 MG/ML
1000 INJECTION INTRAMUSCULAR; INTRAVENOUS; SUBCUTANEOUS
Qty: 0 | Refills: 0 | Status: DISCONTINUED | OUTPATIENT
Start: 2018-01-05 | End: 2018-01-05

## 2018-01-05 RX ORDER — SODIUM CHLORIDE 9 MG/ML
3 INJECTION INTRAMUSCULAR; INTRAVENOUS; SUBCUTANEOUS ONCE
Qty: 0 | Refills: 0 | Status: COMPLETED | OUTPATIENT
Start: 2018-01-05 | End: 2018-01-05

## 2018-01-05 RX ORDER — KETOROLAC TROMETHAMINE 30 MG/ML
30 SYRINGE (ML) INJECTION ONCE
Qty: 0 | Refills: 0 | Status: DISCONTINUED | OUTPATIENT
Start: 2018-01-05 | End: 2018-01-05

## 2018-01-05 RX ORDER — IBUPROFEN 200 MG
1 TABLET ORAL
Qty: 40 | Refills: 0
Start: 2018-01-05 | End: 2018-01-14

## 2018-01-05 RX ADMIN — SODIUM CHLORIDE 125 MILLILITER(S): 9 INJECTION INTRAMUSCULAR; INTRAVENOUS; SUBCUTANEOUS at 01:27

## 2018-01-05 RX ADMIN — SODIUM CHLORIDE 3 MILLILITER(S): 9 INJECTION INTRAMUSCULAR; INTRAVENOUS; SUBCUTANEOUS at 01:27

## 2018-01-05 RX ADMIN — SODIUM CHLORIDE 125 MILLILITER(S): 9 INJECTION INTRAMUSCULAR; INTRAVENOUS; SUBCUTANEOUS at 04:01

## 2018-01-05 RX ADMIN — SODIUM CHLORIDE 125 MILLILITER(S): 9 INJECTION INTRAMUSCULAR; INTRAVENOUS; SUBCUTANEOUS at 02:20

## 2018-01-05 RX ADMIN — Medication 30 MILLIGRAM(S): at 02:20

## 2018-01-05 RX ADMIN — SODIUM CHLORIDE 125 MILLILITER(S): 9 INJECTION INTRAMUSCULAR; INTRAVENOUS; SUBCUTANEOUS at 03:22

## 2018-01-05 RX ADMIN — Medication 30 MILLIGRAM(S): at 01:27

## 2018-01-05 NOTE — ED ADULT NURSE REASSESSMENT NOTE - NS ED NURSE REASSESS COMMENT FT1
Pt in stretcher. Continues to c/o pain to right flank but stated "feels better". All needs are met and safety maintained. Will continue to monitor.

## 2018-01-05 NOTE — ED PROVIDER NOTE - OBJECTIVE STATEMENT
76 y/o female in ED c/o right mid back pain x 2 days.  pt states pain started after coughing and reaching for something.  pt states taking ASA with minimal relief.  pt denies any fever, HA, cp, sob, n/v/d/abd pain.  pt states increase pain with movement and palpation.  no sick contacts or recent travel

## 2018-01-05 NOTE — ED ADULT NURSE REASSESSMENT NOTE - NS ED NURSE REASSESS COMMENT FT1
pt has oral temp of 99.2. rectal temp measurement offered and pt refused. Pt A&Ox3 and has capacity make decision for herself. Will continue to monitor.

## 2018-01-05 NOTE — ED ADULT NURSE NOTE - OBJECTIVE STATEMENT
Pt presented to ED c/o flank pain. As per pt, pt's been prepping the house for storm and did some heavy lifting and chores for 2 days. Since then pt's been having sharp pain to right flank that radiates to mid back that progressively worsen. The pain exacerbates with movement. Denies any other complaints at this time.

## 2018-01-05 NOTE — ED ADULT NURSE NOTE - CHPI ED SYMPTOMS NEG
no nausea/no fever/no numbness/no dizziness/no tingling/no weakness/no chills/no decreased eating/drinking/no vomiting

## 2023-02-28 ENCOUNTER — EMERGENCY (EMERGENCY)
Facility: HOSPITAL | Age: 83
LOS: 0 days | Discharge: HOME | End: 2023-02-28
Attending: STUDENT IN AN ORGANIZED HEALTH CARE EDUCATION/TRAINING PROGRAM
Payer: MEDICARE

## 2023-02-28 VITALS
WEIGHT: 123.02 LBS | HEART RATE: 92 BPM | TEMPERATURE: 99 F | RESPIRATION RATE: 20 BRPM | SYSTOLIC BLOOD PRESSURE: 128 MMHG | OXYGEN SATURATION: 92 % | DIASTOLIC BLOOD PRESSURE: 97 MMHG

## 2023-02-28 DIAGNOSIS — Z90.89 ACQUIRED ABSENCE OF OTHER ORGANS: Chronic | ICD-10-CM

## 2023-02-28 PROBLEM — N81.4 UTEROVAGINAL PROLAPSE, UNSPECIFIED: Chronic | Status: ACTIVE | Noted: 2017-02-08

## 2023-02-28 PROBLEM — E78.00 PURE HYPERCHOLESTEROLEMIA, UNSPECIFIED: Chronic | Status: ACTIVE | Noted: 2017-02-08

## 2023-02-28 PROBLEM — E66.9 OBESITY, UNSPECIFIED: Chronic | Status: ACTIVE | Noted: 2017-02-08

## 2023-02-28 PROBLEM — G40.909 EPILEPSY, UNSPECIFIED, NOT INTRACTABLE, WITHOUT STATUS EPILEPTICUS: Chronic | Status: ACTIVE | Noted: 2017-02-08

## 2023-02-28 PROBLEM — I10 ESSENTIAL (PRIMARY) HYPERTENSION: Chronic | Status: ACTIVE | Noted: 2017-02-08

## 2023-02-28 PROBLEM — M19.90 UNSPECIFIED OSTEOARTHRITIS, UNSPECIFIED SITE: Chronic | Status: ACTIVE | Noted: 2017-02-08

## 2023-02-28 LAB
ALBUMIN SERPL ELPH-MCNC: 3.8 G/DL — SIGNIFICANT CHANGE UP (ref 3.3–5)
ALP SERPL-CCNC: 130 U/L — HIGH (ref 40–120)
ALT FLD-CCNC: 16 U/L — SIGNIFICANT CHANGE UP (ref 12–78)
ANION GAP SERPL CALC-SCNC: 4 MMOL/L — LOW (ref 5–17)
AST SERPL-CCNC: 15 U/L — SIGNIFICANT CHANGE UP (ref 15–37)
BASOPHILS # BLD AUTO: 0.11 K/UL — SIGNIFICANT CHANGE UP (ref 0–0.2)
BASOPHILS NFR BLD AUTO: 1 % — SIGNIFICANT CHANGE UP (ref 0–2)
BILIRUB SERPL-MCNC: 0.3 MG/DL — SIGNIFICANT CHANGE UP (ref 0.2–1.2)
BUN SERPL-MCNC: 15 MG/DL — SIGNIFICANT CHANGE UP (ref 7–23)
CALCIUM SERPL-MCNC: 9.8 MG/DL — SIGNIFICANT CHANGE UP (ref 8.5–10.1)
CHLORIDE SERPL-SCNC: 106 MMOL/L — SIGNIFICANT CHANGE UP (ref 96–108)
CO2 SERPL-SCNC: 28 MMOL/L — SIGNIFICANT CHANGE UP (ref 22–31)
CREAT SERPL-MCNC: 0.72 MG/DL — SIGNIFICANT CHANGE UP (ref 0.5–1.3)
EGFR: 84 ML/MIN/1.73M2 — SIGNIFICANT CHANGE UP
EOSINOPHIL # BLD AUTO: 0.07 K/UL — SIGNIFICANT CHANGE UP (ref 0–0.5)
EOSINOPHIL NFR BLD AUTO: 0.6 % — SIGNIFICANT CHANGE UP (ref 0–6)
GLUCOSE SERPL-MCNC: 92 MG/DL — SIGNIFICANT CHANGE UP (ref 70–99)
HCT VFR BLD CALC: 38.4 % — SIGNIFICANT CHANGE UP (ref 34.5–45)
HGB BLD-MCNC: 12.5 G/DL — SIGNIFICANT CHANGE UP (ref 11.5–15.5)
IMM GRANULOCYTES NFR BLD AUTO: 0.4 % — SIGNIFICANT CHANGE UP (ref 0–0.9)
LIDOCAIN IGE QN: 125 U/L — SIGNIFICANT CHANGE UP (ref 73–393)
LYMPHOCYTES # BLD AUTO: 19.3 % — SIGNIFICANT CHANGE UP (ref 13–44)
LYMPHOCYTES # BLD AUTO: 2.13 K/UL — SIGNIFICANT CHANGE UP (ref 1–3.3)
MCHC RBC-ENTMCNC: 29.6 PG — SIGNIFICANT CHANGE UP (ref 27–34)
MCHC RBC-ENTMCNC: 32.6 GM/DL — SIGNIFICANT CHANGE UP (ref 32–36)
MCV RBC AUTO: 91 FL — SIGNIFICANT CHANGE UP (ref 80–100)
MONOCYTES # BLD AUTO: 1.23 K/UL — HIGH (ref 0–0.9)
MONOCYTES NFR BLD AUTO: 11.1 % — SIGNIFICANT CHANGE UP (ref 2–14)
NEUTROPHILS # BLD AUTO: 7.48 K/UL — HIGH (ref 1.8–7.4)
NEUTROPHILS NFR BLD AUTO: 67.6 % — SIGNIFICANT CHANGE UP (ref 43–77)
PLATELET # BLD AUTO: 433 K/UL — HIGH (ref 150–400)
POTASSIUM SERPL-MCNC: 3.7 MMOL/L — SIGNIFICANT CHANGE UP (ref 3.5–5.3)
POTASSIUM SERPL-SCNC: 3.7 MMOL/L — SIGNIFICANT CHANGE UP (ref 3.5–5.3)
PROT SERPL-MCNC: 7.9 GM/DL — SIGNIFICANT CHANGE UP (ref 6–8.3)
RBC # BLD: 4.22 M/UL — SIGNIFICANT CHANGE UP (ref 3.8–5.2)
RBC # FLD: 14.1 % — SIGNIFICANT CHANGE UP (ref 10.3–14.5)
SODIUM SERPL-SCNC: 138 MMOL/L — SIGNIFICANT CHANGE UP (ref 135–145)
WBC # BLD: 11.06 K/UL — HIGH (ref 3.8–10.5)
WBC # FLD AUTO: 11.06 K/UL — HIGH (ref 3.8–10.5)

## 2023-02-28 PROCEDURE — 74177 CT ABD & PELVIS W/CONTRAST: CPT | Mod: 26,MA

## 2023-02-28 PROCEDURE — 99284 EMERGENCY DEPT VISIT MOD MDM: CPT

## 2023-02-28 PROCEDURE — 83690 ASSAY OF LIPASE: CPT

## 2023-02-28 PROCEDURE — 74177 CT ABD & PELVIS W/CONTRAST: CPT | Mod: MA

## 2023-02-28 PROCEDURE — 85025 COMPLETE CBC W/AUTO DIFF WBC: CPT

## 2023-02-28 PROCEDURE — 80053 COMPREHEN METABOLIC PANEL: CPT

## 2023-02-28 PROCEDURE — 36415 COLL VENOUS BLD VENIPUNCTURE: CPT

## 2023-02-28 PROCEDURE — 96374 THER/PROPH/DIAG INJ IV PUSH: CPT | Mod: XU

## 2023-02-28 PROCEDURE — 99284 EMERGENCY DEPT VISIT MOD MDM: CPT | Mod: 25

## 2023-02-28 RX ORDER — SODIUM CHLORIDE 9 MG/ML
1000 INJECTION INTRAMUSCULAR; INTRAVENOUS; SUBCUTANEOUS ONCE
Refills: 0 | Status: COMPLETED | OUTPATIENT
Start: 2023-02-28 | End: 2023-02-28

## 2023-02-28 RX ORDER — ACETAMINOPHEN 500 MG
1000 TABLET ORAL ONCE
Refills: 0 | Status: COMPLETED | OUTPATIENT
Start: 2023-02-28 | End: 2023-02-28

## 2023-02-28 RX ADMIN — SODIUM CHLORIDE 1000 MILLILITER(S): 9 INJECTION INTRAMUSCULAR; INTRAVENOUS; SUBCUTANEOUS at 12:49

## 2023-02-28 RX ADMIN — Medication 400 MILLIGRAM(S): at 12:49

## 2023-02-28 NOTE — ED ADULT NURSE NOTE - NSIMPLEMENTINTERV_GEN_ALL_ED
[address] Comprehensive Implemented All Fall with Harm Risk Interventions:  Donna to call system. Call bell, personal items and telephone within reach. Instruct patient to call for assistance. Room bathroom lighting operational. Non-slip footwear when patient is off stretcher. Physically safe environment: no spills, clutter or unnecessary equipment. Stretcher in lowest position, wheels locked, appropriate side rails in place. Provide visual cue, wrist band, yellow gown, etc. Monitor gait and stability. Monitor for mental status changes and reorient to person, place, and time. Review medications for side effects contributing to fall risk. Reinforce activity limits and safety measures with patient and family. Provide visual clues: red socks.

## 2023-02-28 NOTE — ED PROVIDER NOTE - OBJECTIVE STATEMENT
83 y/o female with a PMHx of epilepsy, HTN, hypercholesterolemia, obesity, osteoarthritis, prolapsed uterus presents to the ED c/o right groin pain x1 week, worsening. Pt also c/o chronic back pain. No falls or trauma. Denies urinary symptoms. Pt lives alone. No other complaints at this time. 83 y/o female with a PMHx of epilepsy, HTN, hypercholesterolemia, obesity, osteoarthritis, prolapsed uterus presents to the ED c/o right groin pain x1 week, worsening. Pt also c/o chronic lower back pain. No falls or trauma. Denies urinary symptoms. Pt lives alone. No other complaints at this time.

## 2023-02-28 NOTE — ED PROVIDER NOTE - NSICDXPASTMEDICALHX_GEN_ALL_CORE_FT
PAST MEDICAL HISTORY:  Epilepsy     HTN (hypertension)     Hypercholesteremia     Obesity     Osteoarthritis     Prolapsed uterus complete prolapse

## 2023-02-28 NOTE — ED PROVIDER NOTE - PATIENT PORTAL LINK FT
You can access the FollowMyHealth Patient Portal offered by [facility] by registering at the following website: [email or web address]. By joining [employer]’s FollowMyHealth portal, you will also be able to view your health information using other applications (apps) compatible with our system.

## 2023-02-28 NOTE — ED PROVIDER NOTE - MUSCULOSKELETAL, MLM
Spine appears normal, range of motion is not limited, no muscle or joint tenderness Spine appears normal. Tenderness to right groin. +SLR on right. Kyphosis of spine.

## 2023-02-28 NOTE — ED PROVIDER NOTE - CLINICAL SUMMARY MEDICAL DECISION MAKING FREE TEXT BOX
81 y/o male with right groin pain. Likely sciatica. Will order labs, CT imaging r/o intraabdominal pathology, UA, reeval.

## 2023-02-28 NOTE — ED PROVIDER NOTE - NSICDXFAMILYHX_GEN_ALL_CORE_FT
FAMILY HISTORY:  Father  Still living? Unknown  Family history of cancer, Age at diagnosis: Age Unknown    Mother  Still living? Unknown  Family history of cancer, Age at diagnosis: Age Unknown    Sibling  Still living? Unknown  Family history of cancer, Age at diagnosis: Age Unknown

## 2023-02-28 NOTE — ED ADULT NURSE NOTE - NEURO ASSESSMENT
November 17, 2022       Anthony Reyes MD  [address] [facility]  [address]  [facility] [address]  Via Fax: [phone]      Patient: Cornelius Cuenca   YOB: 2011   Date of Visit: 11/16/2022       Dear Dr. Reyes:    Thank you for referring Cornelius Cuenca to me for evaluation. Below are my notes for this visit with her.    If you have questions, please do not hesitate to call me. I look forward to following your patient along with you.      Sincerely,        Kwasi Hunter MD        CC: No Recipients  Kwasi Hunter MD  11/16/2022  3:16 PM  Signed  Dear: Anthony Reyes MD    We had the pleasure of seeing your patient in the [facility]phrology [facility] on 11/16/22. Please find our consultation summary below.    Cornelius Cuenca is a 10 year old female who presents for a  a follow up visit regarding:    CHIEF COMPLAINT  Hyponatremia  Hypertension  proteinuria    Cornelius is accompanied and history obtained by: mother, grandfather    History of present illness:  Original HPI  1.  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  She has recently been admitted to [facility] on two occasions with hyponatremia.  Cornelius Rivera has had hyponatremia since early  September 2022.  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   She has been prescribed oral sodium chloride,  Sodium chloride 4 meq/ml,  45 ml q 12 hours =  180 meq q 12 hours,   usually taken at about 6 AM and 6 PM, however, apparently  Cornelius did not receive oral sodium chloride on the morning of admission on 11.07.2022.    Cornelius has also been prescribed intravenous sodium chloride, 3 % sodium chloride, 70 ml / hour x 12 hours = 840 ml per day or about 420 meq / day sodium chloride.   However,  Cornelius actually receives between seven and twelve hours of the 3 % sodium chloride infusion.   On 11.02.2022, the last time she had labs in oncology infusion  center, she had received seven hours of the 3 % sodium chloride.     Date                          Serum  Sodium                                   (meq/liter)  10.19.2022               128  10.27.2022               133  11.02.2022               143  11.07.2022               132    11.07.2022  CBC  WBC 9  K, HGB  10.8 GM/DL, HCT  32 %,  K   2.  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   Serum sodium level was normal on 11.02.2022,   serum sodium level was 143 meq/liter on 11.02.2022.    Serum sodium level was 132 meq/liter on 11.07.2022 PM, reportedly  Cornelius did not receive her oral AM dose of sodium chloride and received seven hours of the night time 3 % sodium chloride infusion.  There may be improvement in the cerebral salt wasting / SIADH secretion.    3.  11.07.2022  Urine sodium 157 meq/liter - this is elevated for a patient with hyponatremia  Urine sodium should be less than 20 meq / liter for a patient with hyponatremia  However,  Cornelius had just received oral sodium prior to collecting the urine specimen  4.  11.08.2022  Cornelius is awake and eating breakfast at the time of the  Pediatric  Nephrology visit.  5.  11.07.2022 - 11.08.2022, blood pressures 120 - 153 / 52 - 114 mm   Hg   Blood pressures elevated   6.  11.08.2022  Labs  11.08.2022   6  AM  Sodium 141, potassium 4.3, chloride 114, bicarbonate 22,  BUN 5 mg/dl, creatinine  0.44 mg/dl, calcium 9.1 mg/dl  Serum osmolality 292 milliosmoles / kg water         Date /  Time                         Serum Sodium         Serum Osmolality                                               (meq/liter)                 (milliosmoles / kg water)  11.08.2022/06:16                141                           292  11.08.2022/11:45                136                           282  11.08.2022/18:37                136                           280  11.09.2022/06:08                135                            288     Cornelius is now prescribed sodium chloride 25 ml  X  4 meq/ml q 12 hours = 100 meq q 12 hours  Alternatively,  Cornelius may receive sodium chloride 1000 mg tablet sodium chloride x 6 tablets q 12 hours  Alternatively,  Cornelius may have 1 and 1/4 teaspoon of table salt added to food or taken directly from the spoon q 12 hours.            7.  11.10.2022  Labs  11.10.2022  Sodium 134, potassium 4.0, chloride 104, bicarbonate 21,  BUN 8 mg/dl, creatinine  0.33 mg/dl, calcium 9.6 mg/dl  Serum osmolality 281 milliosmoles / kg water   Urine protein  40 mg/dl  Urine creatinine  43 mg/dl  Urine protein to creatinine ratio 0.930   Urine analysis  11.10.2022  Urine sp gravity  1.011  Urine pH 7  Urine 30 mg/dl protein, otherwise, negative on dipstick examination  Urine negative on microscopic examination of the urine sediment  8.  11.10.2022  Proteinuria and hypertension most likely secondary to chemotherapy  9.  11.10.2022  Cornelius is currently receiving sodium chloride  1000 mg tablet x 6 tablets BID =   sodium chloride 6000 mg BID   10.  11.07.2022  Labs 11.07.2022  Serum aldosterone   4.8 ng/dl   Renin activity 0.2 ng/ml/hour      Today's Update:   Since discharge from [facility] on 11.10.2022,   There has been no history of fever,  There has been no  history of gross hematuria  There has been no history of foul smelling urine.  There has been no nausea, no vomiting, no diarrhea.  There has been no constipation.  There has been no abdominal pain, flank pain, dysuria.  There has been no urinary frequency, urinary urgency, urinary hesitancy.  There has been no daytime urinary incontinence.  There has been no night time urinary incontinence.  There has been no fecal incontinence during the daytime or the night time.  There has been no edema  There has been no bruising or bleeding.  There has been no rash.  There has been no thrush.  There has been no joint pain or joint swelling.  There has been no pallor,  cyanosis, erythema.   There has been no wheezing or shortness of breath.  There has been no mouth sore, no tremor.  There has been no palpitations.  There has been no headache, nosebleed, blurry vision, dizziness.  There has been no hearing problem.  There has been no visual problem.    Review of Systems   Constitutional: Negative for activity change, appetite change, chills, diaphoresis, fatigue, fever, irritability and unexpected weight change.   HENT: Negative for congestion, dental problem, drooling, ear discharge, ear pain, facial swelling, hearing loss, mouth sores, nosebleeds, postnasal drip, rhinorrhea, sinus pressure, sinus pain, sneezing, sore throat, tinnitus, trouble swallowing and voice change.    Eyes: Negative for photophobia, pain, discharge, redness, itching and visual disturbance.   Respiratory: Negative for apnea, cough, choking, chest tightness, shortness of breath, wheezing and stridor.    Cardiovascular: Negative for chest pain, palpitations and leg swelling.   Gastrointestinal: Negative for abdominal distention, abdominal pain, anal bleeding, blood in stool, constipation, diarrhea, nausea, rectal pain and vomiting.   Endocrine: Negative for cold intolerance, heat intolerance, polydipsia, polyphagia and polyuria.   Genitourinary: Negative for decreased urine volume, difficulty urinating, dysuria, enuresis, flank pain, frequency, genital sores, hematuria and urgency.   Musculoskeletal: Negative for arthralgias, back pain, gait problem, joint swelling, myalgias, neck pain and neck stiffness.   Skin: Negative for color change, pallor, rash and wound.   Allergic/Immunologic: Negative for environmental allergies, food allergies and immunocompromised state.   Neurological: Negative for dizziness, tremors, seizures, syncope, facial asymmetry, speech difficulty, weakness, light-headedness, numbness and headaches.   Hematological: Negative for adenopathy. Does not bruise/bleed easily.    Psychiatric/Behavioral: Negative for agitation, behavioral problems, confusion, decreased concentration, dysphoric mood, hallucinations, self-injury, sleep disturbance and suicidal ideas. The patient is not nervous/anxious and is not hyperactive.        Current Outpatient Medications   Medication Sig Dispense Refill   • hydroCORTisone (CORTEF) 5 MG tablet Starting 11/9 -11/12 will be 5mg TID; 11/13 -11/16 will be 5mg in am, 2.5mg in afternoon, and 5mg at night; 11/17-11/23 5mg in am, 2.5mg afternoon, and 2.5mg at night. Then visit clinic for further instructions per mom.     • sodium chloride 1 g tablet Take 6 tablets by mouth in the morning and 6 tablets in the evening. 360 tablet 0   • losartan (COZAAR) 25 MG tablet Take 1 tablet by mouth daily. Take first dose 11/11. 30 tablet 0   • butalbital-APAP-caffeine (FIORICET) -40 MG per tablet Take 1 tablet by mouth 2 times daily as needed for Headaches. 30 tablet 0   • levothyroxine 25 MCG tablet Take 25 mcg by mouth daily.     • famotidine (PEPCID) 10 MG tablet Take 10 mg by mouth every morning.     • sulfamethoxazole-trimethoprim (BACTRIM) 200-40 MG/5ML suspension Take 9 mLs by mouth 2 times daily on Monday and Tuesday.     • docusate sodium-sennosides (SENOKOT S) 50-8.6 MG per tablet Take 1 tablet by mouth daily. 30 tablet 3   • diazePAM 10 MG/0.1ML Liquid Administer 1 spray (10 mg) in one nostril as needed (seizures). (Patient taking differently: 1 spray as needed. Administer 1 spray (10 mg) in one nostril as needed (seizures).) 1 each 5   • diphenhydrAMINE-Phenylephrine (Benadryl Allergy Childrens) 12.5-5 MG/5ML Solution Take 25 mg by mouth every 6 hours as needed (for nausea).     • levETIRAcetam ORAL (Keppra) 100 MG/ML oral solution Take 3.14 mLs by mouth in the morning and 3.14 mLs in the evening. (Patient taking differently: Take 10 mg/kg by mouth 2 times daily. 3.14ml dose) 190 mL 3   • sodium chloride flush 0.9 % injectable solution Inject 10 mLs into  the vein every morning. Line patency     • heparin, porcine, 10 UNIT/ML injectable solution Inject 5 mLs into the vein as needed ([facility], line patency).     • diphenhydrAMINE (BENADRYL) 12.5 MG/5ML liquid Take 10 mLs by mouth every 6 hours as needed (Nausea). 30 each 3   • ibuprofen (CHILDRENS ADVIL) 100 MG/5ML suspension Take 15.5 mLs by mouth every 6 hours as needed for Pain.     • leuprolide, 1 Month, (Lupron Depot-Ped, 1-Month,) 15 MG injection Inject 15 mg into the muscle every 28 days. 1 kit 6   • polyethylene glycol (MIRALAX) 17 GM/SCOOP powder Take 17 g by mouth daily. Mix 1 capful with 4-8 ounces of beverage then drink. 850 g 3   • ondansetron ORAL (ZOFRAN) 4 MG/5ML oral solution Take 5 mLs by mouth 3 times daily as needed for Nausea. 130 mL 11   • sodium chloride (OCEAN) 0.65 % nasal spray Spray 1 spray in each nostril as needed for Congestion.       No current facility-administered medications for this visit.       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    • Other forms of nystagmus    • Precocious puberty        Past Surgical History:   Procedure Laterality Date   • Brain surgery Right 03/29/2016    right frontal   • Brain surgery  10/24/2019    shunt, right   • Eeg inc recording awake and drowsy  9/26/2022        • Peritoneal shunt Right 10/23/2019   • Portacath placement  04/21/2016   • Shunt revision  01/20/2021    proximal   • Ventriculoperitoneal shunt         FAMILY HISTORY  Father hyperlipidemia  Mother healthy     Paternal grand father hyperlipidemia, diabetes mellitus  Paternal grand mother hyperlipidemia, diabetes mellitus  Paternal aunt healthy  Maternal grand father s/p  myocardial infarction  Maternal grand mother healthy  Maternal aunts and uncles healthy     SOCIAL HISTORY   reports that she has never smoked. She has never used smokeless tobacco. She reports that she does not drink alcohol and does not use drugs.   Cornelius Cuenca is living in  [place], [place] with mother, father      There were no vitals filed for this visit.  BSA: There is no height or weight on file to calculate BSA.  Growth percentiles: No height on file for this encounter. No weight on file for this encounter.   Physical Exam  Vitals and nursing note reviewed.   Constitutional:       General: She is active. She is not in acute distress.     Appearance: She is well-developed. She is not toxic-appearing or diaphoretic.      Comments: Cushingoid features present    HENT:      Head: Normocephalic and atraumatic. No signs of injury.      Comments: Cushingoid features present      Right Ear: Tympanic membrane, ear canal and external ear normal. There is no impacted cerumen. Tympanic membrane is not erythematous or bulging.      Left Ear: Tympanic membrane, ear canal and external ear normal. There is no impacted cerumen. Tympanic membrane is not erythematous or bulging.      Nose: Nose normal. No congestion or rhinorrhea.      Mouth/Throat:      Mouth: Mucous membranes are moist.      Dentition: No dental caries.      Pharynx: Oropharynx is clear. No oropharyngeal exudate or posterior oropharyngeal erythema.      Tonsils: No tonsillar exudate.      Neck: Normal range of motion and neck supple. No rigidity or tenderness. No muscular tenderness.   Eyes:      General:         Right eye: No discharge.         Left eye: No discharge.      Extraocular Movements: Extraocular movements intact.      Conjunctiva/sclera: Conjunctivae normal.      Pupils: Pupils are equal, round, and reactive to light.   Cardiovascular:      Rate and Rhythm: Normal rate and regular rhythm.      Pulses: Normal pulses.      Heart sounds:  Normal heart sounds, S1 normal and S2 normal. No murmur heard.    No friction rub. No gallop.   Pulmonary:      Effort: Pulmonary effort is normal. No respiratory distress, nasal flaring or retractions.      Breath sounds: Normal breath sounds and air entry. No stridor or decreased air movement. No wheezing, rhonchi or rales.   Abdominal:      General: Bowel sounds are normal. There is no distension.      Palpations: Abdomen is soft. There is no mass.      Tenderness: There is no abdominal tenderness. There is no guarding or rebound.      Hernia: No hernia is present. There is no hernia in the left inguinal area.   Genitourinary:     Exam position: Supine.      Labia:         Right: No rash, tenderness, lesion or injury.         Left: No rash, tenderness, lesion or injury.    Musculoskeletal:         General: No swelling, tenderness, deformity or signs of injury. Normal range of motion.   Lymphadenopathy:      Cervical: No cervical adenopathy.      Lower Body: No right inguinal adenopathy. No left inguinal adenopathy.   Skin:     General: Skin is warm.      Capillary Refill: Capillary refill takes less than 2 seconds.      Coloration: Skin is not cyanotic, jaundiced or pale.      Findings: No erythema, petechiae or rash. Rash is not purpuric.   Neurological:      General: No focal deficit present.      Mental Status: She is alert.      Cranial Nerves: No cranial nerve deficit.      Sensory: No sensory deficit.      Motor: No weakness or abnormal muscle tone.      Coordination: Coordination normal.      Gait: Gait normal.      Deep Tendon Reflexes: Reflexes normal.   Psychiatric:         Mood and Affect: Mood normal.         Behavior: Behavior normal.         Lab and Imaging Results  Recent Results (from the past 4200 hour(s))   2019 NOVEL CORONAVIRUS (SARS-COV-2)    Collection Time: 08/03/22  4:09 PM   Result Value Ref Range    SARS-CoV-2 by PCR Not Detected Not Detected / Detected / Inhibitor Present    Isolation  Guidelines       Do not use this test result as the sole decision-maker for discontinuation of isolation.   Clinical evaluation should be considered for other respiratory illness requiring transmission-based isolation.    -    No fever (<99.0 F/37.2 C) for at least 24 hours without the use of fever-reducing medications    AND  -    Respiratory symptoms have improved or resolved (e.g. cough, shortness of breath)     AND  -    COVID-19 negative test    See COVID-19 Deisolation Resource Guide      Procedural Notes       Negative for SARS-CoV-2 (2019-nCoV) nucleic acid in the specimen, consider other viruses.    A negative result does not preclude Coronavirus (COVID-19) infection and should not be used as sole basis for treatment or patient management decisions.  Negative results must be combined with clinical observations, patient history, and epidemiological information.    This result was obtained by TMA based method and analysis by fluorescent probes designed for the qualitative detected of the SARS-CoV-2 (2019-nCoV) nucleic acid.  Performance characteristics for the test has been determined by [employer] and are incorporated as part of FDA Emergency Use Authorization (EUA).    This test was performed by [employer] using the FDA cleared Emergency Use Authorization (EUA) Aptima SARS-CoV-2 (2019-nCoV) from [employer].  This laboratory is certified under the Clinical Laboratory Improvement Amendments (CLIA) as qualified to perform high complexity clinical laboratory testing.    COVID-19 Test Results - What you need to know and do       2019 NOVEL CORONAVIRUS (SARS-COV-2)    Collection Time: 08/22/22  4:26 PM   Result Value Ref Range    SARS-CoV-2 by PCR Not Detected Not Detected / Detected / Inhibitor Present    Isolation Guidelines       Do not use this test result as the sole decision-maker for discontinuation of isolation.   Clinical evaluation should be considered for other respiratory illness requiring  transmission-based isolation.    -    No fever (<99.0 F/37.2 C) for at least 24 hours without the use of fever-reducing medications    AND  -    Respiratory symptoms have improved or resolved (e.g. cough, shortness of breath)     AND  -    COVID-19 negative test    See COVID-19 Deisolation Resource Guide      Procedural Notes       Negative for SARS-CoV-2 (2019-nCoV) nucleic acid in the specimen, consider other viruses.    A negative result does not preclude Coronavirus (COVID-19) infection and should not be used as sole basis for treatment or patient management decisions.  Negative results must be combined with clinical observations, patient history, and epidemiological information.    This result was obtained by TMA based method and analysis by fluorescent probes designed for the qualitative detected of the SARS-CoV-2 (2019-nCoV) nucleic acid.  Performance characteristics for the test has been determined by [employer] and are incorporated as part of FDA Emergency Use Authorization (EUA).    This test was performed by [employer] using the FDA cleared Emergency Use Authorization (EUA) Aptima SARS-CoV-2 (2019-nCoV) from [employer].  This laboratory is certified under the Clinical Laboratory Improvement Amendments (CLIA) as qualified to perform high complexity clinical laboratory testing.    COVID-19 Test Results - What you need to know and do       GLUCOSE, BEDSIDE - POINT OF CARE    Collection Time: 08/29/22 10:15 AM   Result Value Ref Range    GLUCOSE, BEDSIDE - POINT OF CARE 101 (H) 70 - 99 mg/dL   Blood Culture    Collection Time: 08/29/22 10:46 AM    Specimen: Blood   Result Value Ref Range    Culture, Blood or Bone Marrow No Growth 5 Days.    SARS-COV-2/INFLUENZA BY PCR    Collection Time: 08/29/22 10:48 AM   Result Value Ref Range    Rapid SARS-COV-2 by PCR Not Detected Not Detected / Detected / Presumptive Positive / Inhibitors present    Influenza A by PCR Not Detected Not Detected    Influenza B by PCR Not  Detected Not Detected    Isolation Guidelines      Procedural Comment     Comprehensive Metabolic Panel    Collection Time: 08/29/22 10:51 AM   Result Value Ref Range    Fasting Status      Sodium 132 (L) 135 - 145 mmol/L    Potassium 4.4 3.4 - 5.1 mmol/L    Chloride 102 97 - 110 mmol/L    Carbon Dioxide 21 21 - 32 mmol/L    Anion Gap 13 7 - 19 mmol/L    Glucose 93 70 - 99 mg/dL    BUN 9 5 - 18 mg/dL    Creatinine 0.34 0.21 - 0.65 mg/dL    Glomerular Filtration Rate      BUN/ Creatinine Ratio 26 (H) 7 - 25    Calcium 10.1 8.0 - 11.0 mg/dL    Bilirubin, Total 0.5 0.2 - 1.4 mg/dL    GOT/AST 22 10 - 55 Units/L    GPT/ALT 29 10 - 30 Units/L    Alkaline Phosphatase 224 150 - 360 Units/L    Albumin 4.6 3.6 - 5.1 g/dL    Protein, Total 8.7 (H) 6.0 - 8.0 g/dL    Globulin 4.1 (H) 2.0 - 4.0 g/dL    A/G Ratio 1.1 1.0 - 2.4   CBC with Automated Differential (performable only)    Collection Time: 08/29/22 10:51 AM   Result Value Ref Range    WBC 5.8 5.0 - 14.5 K/mcL    RBC 4.85 3.90 - 5.30 mil/mcL    HGB 14.7 11.5 - 15.5 g/dL    HCT 41.8 35.0 - 45.0 %    MCV 86.2 77.0 - 95.0 fl    MCH 30.3 25.0 - 33.0 pg    MCHC 35.2 31.0 - 37.0 g/dL    RDW-CV 12.7 11.0 - 15.0 %    RDW-SD 39.5 35.0 - 47.0 fL     140 - 450 K/mcL    NRBC 0 <=0 /100 WBC    Neutrophil, Percent 69 %    Lymphocytes, Percent 25 %    Mono, Percent 6 %    Eosinophils, Percent 0 %    Basophils, Percent 0 %    Immature Granulocytes 0 %    Absolute Neutrophils 4.0 1.5 - 8.0 K/mcL    Absolute Lymphocytes 1.4 (L) 1.5 - 6.8 K/mcL    Absolute Monocytes 0.4 0.0 - 0.8 K/mcL    Absolute Eosinophils  0.0 0.0 - 0.5 K/mcL    Absolute Basophils 0.0 0.0 - 0.2 K/mcL    Absolute Immmature Granulocytes 0.0 0.0 - 0.2 K/mcL   ECG    Collection Time: 08/29/22 12:06 PM   Result Value Ref Range    Ventricular Rate EKG/Min (BPM) 108     Atrial Rate (BPM) 109     OR-Interval (MSEC) 135     QRS-Interval (MSEC) 87     QT-Interval (MSEC) 328     QTc 440     P Axis (Degrees) 72     R Axis  (Degrees) 88     T Axis (Degrees) 56     REPORT TEXT       -------------------- Pediatric ECG interpretation --------------------  Sinus rhythm  Normal ECG  Confirmed by LINWOOD CRYTSAL MD ([id number]) on 8/29/2022 6:51:39 PM     Thyroid Stimulating Hormone    Collection Time: 08/29/22 12:42 PM   Result Value Ref Range    TSH 0.964 0.662 - 4.010 mcUnits/mL   Free T4    Collection Time: 08/29/22 12:42 PM   Result Value Ref Range    T4, Free 1.3 0.8 - 1.4 ng/dL   Urinalysis With Microscopy Exam W/O C/S    Collection Time: 08/29/22 11:20 PM   Result Value Ref Range    COLOR, URINALYSIS Yellow     APPEARANCE, URINALYSIS Clear     GLUCOSE, URINALYSIS Negative Negative mg/dL    BILIRUBIN, URINALYSIS Negative Negative    KETONES, URINALYSIS 20  (A) Negative mg/dL    SPECIFIC GRAVITY, URINALYSIS 1.025 1.005 - 1.030    OCCULT BLOOD, URINALYSIS Negative Negative    PH, URINALYSIS 7.0 5.0 - 7.0    PROTEIN, URINALYSIS 100  (A) Negative mg/dL    UROBILINOGEN, URINALYSIS 0.2 0.2, 1.0 mg/dL    NITRITE, URINALYSIS Negative Negative    LEUKOCYTE ESTERASE, URINALYSIS Negative Negative    SQUAMOUS EPITHELIAL, URINALYSIS None Seen None Seen, 1 to 5 /hpf    ERYTHROCYTES, URINALYSIS 1 to 2 None Seen, 1 to 2 /hpf    LEUKOCYTES, URINALYSIS 1 to 5 None Seen, 1 to 5 /hpf    BACTERIA, URINALYSIS None Seen None Seen /hpf    HYALINE CASTS, URINALYSIS None Seen None Seen, 1 to 5 /lpf    MUCUS Present    Basic Metabolic Panel    Collection Time: 08/30/22  8:28 AM   Result Value Ref Range    Fasting Status      Sodium 135 135 - 145 mmol/L    Potassium 4.6 3.4 - 5.1 mmol/L    Chloride 106 97 - 110 mmol/L    Carbon Dioxide 20 (L) 21 - 32 mmol/L    Anion Gap 14 7 - 19 mmol/L    Glucose 141 (H) 70 - 99 mg/dL    BUN 7 5 - 18 mg/dL    Creatinine 0.31 0.21 - 0.65 mg/dL    Glomerular Filtration Rate      BUN/ Creatinine Ratio 23 7 - 25    Calcium 8.3 8.0 - 11.0 mg/dL   Basic Metabolic Panel    Collection Time: 08/30/22  1:29 PM   Result Value Ref Range    Fasting  Status      Sodium 138 135 - 145 mmol/L    Potassium 4.1 3.4 - 5.1 mmol/L    Chloride 111 (H) 97 - 110 mmol/L    Carbon Dioxide 19 (L) 21 - 32 mmol/L    Anion Gap 12 7 - 19 mmol/L    Glucose 125 (H) 70 - 99 mg/dL    BUN 8 5 - 18 mg/dL    Creatinine 0.34 0.21 - 0.65 mg/dL    Glomerular Filtration Rate      BUN/ Creatinine Ratio 24 7 - 25    Calcium 8.0 8.0 - 11.0 mg/dL   Basic Metabolic Panel    Collection Time: 08/30/22  6:27 PM   Result Value Ref Range    Fasting Status      Sodium 135 135 - 145 mmol/L    Potassium 4.0 3.4 - 5.1 mmol/L    Chloride 108 97 - 110 mmol/L    Carbon Dioxide 21 21 - 32 mmol/L    Anion Gap 10 7 - 19 mmol/L    Glucose 148 (H) 70 - 99 mg/dL    BUN 8 5 - 18 mg/dL    Creatinine 0.30 0.21 - 0.65 mg/dL    Glomerular Filtration Rate      BUN/ Creatinine Ratio 27 (H) 7 - 25    Calcium 8.3 8.0 - 11.0 mg/dL   Basic Metabolic Panel    Collection Time: 08/30/22 11:19 PM   Result Value Ref Range    Fasting Status      Sodium 139 135 - 145 mmol/L    Potassium 4.9 3.4 - 5.1 mmol/L    Chloride 112 (H) 97 - 110 mmol/L    Carbon Dioxide 20 (L) 21 - 32 mmol/L    Anion Gap 12 7 - 19 mmol/L    Glucose 174 (H) 70 - 99 mg/dL    BUN 8 5 - 18 mg/dL    Creatinine 0.32 0.21 - 0.65 mg/dL    Glomerular Filtration Rate      BUN/ Creatinine Ratio 25 7 - 25    Calcium 8.3 8.0 - 11.0 mg/dL   Basic Metabolic Panel    Collection Time: 08/31/22  6:27 AM   Result Value Ref Range    Fasting Status      Sodium 135 135 - 145 mmol/L    Potassium 4.3 3.4 - 5.1 mmol/L    Chloride 109 97 - 110 mmol/L    Carbon Dioxide 19 (L) 21 - 32 mmol/L    Anion Gap 11 7 - 19 mmol/L    Glucose 209 (H) 70 - 99 mg/dL    BUN 7 5 - 18 mg/dL    Creatinine 0.34 0.21 - 0.65 mg/dL    Glomerular Filtration Rate      BUN/ Creatinine Ratio 21 7 - 25    Calcium 8.2 8.0 - 11.0 mg/dL   Basic Metabolic Panel    Collection Time: 08/31/22  1:48 PM   Result Value Ref Range    Fasting Status      Sodium 134 (L) 135 - 145 mmol/L    Potassium 4.1 3.4 - 5.1 mmol/L     Chloride 109 97 - 110 mmol/L    Carbon Dioxide 19 (L) 21 - 32 mmol/L    Anion Gap 10 7 - 19 mmol/L    Glucose 100 (H) 70 - 99 mg/dL    BUN 8 5 - 18 mg/dL    Creatinine 0.35 0.21 - 0.65 mg/dL    Glomerular Filtration Rate      BUN/ Creatinine Ratio 23 7 - 25    Calcium 7.6 (L) 8.0 - 11.0 mg/dL   Basic Metabolic Panel    Collection Time: 08/31/22  6:11 PM   Result Value Ref Range    Fasting Status      Sodium 135 135 - 145 mmol/L    Potassium 4.0 3.4 - 5.1 mmol/L    Chloride 108 97 - 110 mmol/L    Carbon Dioxide 20 (L) 21 - 32 mmol/L    Anion Gap 11 7 - 19 mmol/L    Glucose 94 70 - 99 mg/dL    BUN 9 5 - 18 mg/dL    Creatinine 0.36 0.21 - 0.65 mg/dL    Glomerular Filtration Rate      BUN/ Creatinine Ratio 25 7 - 25    Calcium 7.7 (L) 8.0 - 11.0 mg/dL   Osmolality, Urine    Collection Time: 08/31/22  7:30 PM   Result Value Ref Range    Osmolality, Urine 476 50 - 1,200 mOsm/kg   Sodium, Urine    Collection Time: 08/31/22  7:30 PM   Result Value Ref Range    Sodium, Urine 173 mmol/L   Basic Metabolic Panel    Collection Time: 08/31/22  7:50 PM   Result Value Ref Range    Fasting Status      Sodium 135 135 - 145 mmol/L    Potassium 4.2 3.4 - 5.1 mmol/L    Chloride 110 97 - 110 mmol/L    Carbon Dioxide 21 21 - 32 mmol/L    Anion Gap 8 7 - 19 mmol/L    Glucose 93 70 - 99 mg/dL    BUN 9 5 - 18 mg/dL    Creatinine 0.37 0.21 - 0.65 mg/dL    Glomerular Filtration Rate      BUN/ Creatinine Ratio 24 7 - 25    Calcium 8.0 8.0 - 11.0 mg/dL   Osmolality    Collection Time: 08/31/22  7:50 PM   Result Value Ref Range    Osmolality 280 275 - 300 mOsm/kg   Basic Metabolic Panel    Collection Time: 09/01/22 12:00 AM   Result Value Ref Range    Fasting Status      Sodium 133 (L) 135 - 145 mmol/L    Potassium 3.6 3.4 - 5.1 mmol/L    Chloride 105 97 - 110 mmol/L    Carbon Dioxide 22 21 - 32 mmol/L    Anion Gap 10 7 - 19 mmol/L    Glucose 128 (H) 70 - 99 mg/dL    BUN 8 5 - 18 mg/dL    Creatinine 0.36 0.21 - 0.65 mg/dL    Glomerular Filtration  Rate      BUN/ Creatinine Ratio 22 7 - 25    Calcium 8.0 8.0 - 11.0 mg/dL   URINALYSIS, MACROSCOPIC -POINT OF CARE    Collection Time: 09/01/22  1:42 AM   Result Value Ref Range    COLOR - POINT OF CARE Yellow     APPEARANCE, URINALYSIS - POINT OF CARE Clear     GLUCOSE, URINALYSIS - POINT OF CARE Negative Negative mg/dL    BILIRUBIN, URINALYSIS - POINT OF CARE Negative Negative    KETONES, URINALYSIS - POINT OF CARE Negative Negative mg/dL    SPECIFIC GRAVITY, URINALYSIS - POINT OF CARE 1.020 1.005 - 1.030 NULL    OCCULT BLOOD, URINALYSIS - POINT OF CARE Negative Negative    PH, URINALYSIS - POINT OF CARE 7.0 5.0 - 7.0 Units    PROTEIN, URINALYSIS - POINT OF CARE Negative Negative mg/dL    UROBILINOGEN, URINALYSIS - POINT OF CARE 0.2 0.2, 1.0 mg/dL    NITRITE, URINALYSIS - POINT OF CARE Negative Negative    WBC ESTERASE, URINALYSIS - POINT OF CARE Trace (A) Negative   Sodium, Urine    Collection Time: 09/01/22  5:30 AM   Result Value Ref Range    Sodium, Urine 145 mmol/L   Osmolality, Urine    Collection Time: 09/01/22  5:30 AM   Result Value Ref Range    Osmolality, Urine 342 50 - 1,200 mOsm/kg   Basic Metabolic Panel    Collection Time: 09/01/22  6:00 AM   Result Value Ref Range    Fasting Status      Sodium 130 (L) 135 - 145 mmol/L    Potassium 3.9 3.4 - 5.1 mmol/L    Chloride 102 97 - 110 mmol/L    Carbon Dioxide 22 21 - 32 mmol/L    Anion Gap 10 7 - 19 mmol/L    Glucose 95 70 - 99 mg/dL    BUN 7 5 - 18 mg/dL    Creatinine 0.39 0.21 - 0.65 mg/dL    Glomerular Filtration Rate      BUN/ Creatinine Ratio 18 7 - 25    Calcium 8.6 8.0 - 11.0 mg/dL   Osmolality    Collection Time: 09/01/22  6:00 AM   Result Value Ref Range    Osmolality 268 (L) 275 - 300 mOsm/kg   Basic Metabolic Panel    Collection Time: 09/01/22 10:10 AM   Result Value Ref Range    Fasting Status      Sodium 132 (L) 135 - 145 mmol/L    Potassium 4.0 3.4 - 5.1 mmol/L    Chloride 105 97 - 110 mmol/L    Carbon Dioxide 23 21 - 32 mmol/L    Anion Gap 8  7 - 19 mmol/L    Glucose 95 70 - 99 mg/dL    BUN 8 5 - 18 mg/dL    Creatinine 0.32 0.21 - 0.65 mg/dL    Glomerular Filtration Rate      BUN/ Creatinine Ratio 25 7 - 25    Calcium 8.0 8.0 - 11.0 mg/dL   Basic Metabolic Panel    Collection Time: 09/01/22  2:08 PM   Result Value Ref Range    Fasting Status      Sodium 134 (L) 135 - 145 mmol/L    Potassium 3.9 3.4 - 5.1 mmol/L    Chloride 104 97 - 110 mmol/L    Carbon Dioxide 23 21 - 32 mmol/L    Anion Gap 11 7 - 19 mmol/L    Glucose 91 70 - 99 mg/dL    BUN 8 5 - 18 mg/dL    Creatinine 0.39 0.21 - 0.65 mg/dL    Glomerular Filtration Rate      BUN/ Creatinine Ratio 21 7 - 25    Calcium 8.7 8.0 - 11.0 mg/dL   Basic Metabolic Panel    Collection Time: 09/01/22  5:41 PM   Result Value Ref Range    Fasting Status      Sodium 132 (L) 135 - 145 mmol/L    Potassium 3.9 3.4 - 5.1 mmol/L    Chloride 104 97 - 110 mmol/L    Carbon Dioxide 24 21 - 32 mmol/L    Anion Gap 8 7 - 19 mmol/L    Glucose 101 (H) 70 - 99 mg/dL    BUN 7 5 - 18 mg/dL    Creatinine 0.38 0.21 - 0.65 mg/dL    Glomerular Filtration Rate      BUN/ Creatinine Ratio 18 7 - 25    Calcium 8.1 8.0 - 11.0 mg/dL   Basic Metabolic Panel    Collection Time: 09/01/22  9:57 PM   Result Value Ref Range    Fasting Status      Sodium 142 135 - 145 mmol/L    Potassium 3.7 3.4 - 5.1 mmol/L    Chloride 118 (H) 97 - 110 mmol/L    Carbon Dioxide 20 (L) 21 - 32 mmol/L    Anion Gap 8 7 - 19 mmol/L    Glucose 82 70 - 99 mg/dL    BUN 6 5 - 18 mg/dL    Creatinine 0.26 0.21 - 0.65 mg/dL    Glomerular Filtration Rate      BUN/ Creatinine Ratio 23 7 - 25    Calcium 7.7 (L) 8.0 - 11.0 mg/dL   Basic Metabolic Panel    Collection Time: 09/02/22  5:07 AM   Result Value Ref Range    Fasting Status      Sodium 136 135 - 145 mmol/L    Potassium 4.1 3.4 - 5.1 mmol/L    Chloride 108 97 - 110 mmol/L    Carbon Dioxide 22 21 - 32 mmol/L    Anion Gap 10 7 - 19 mmol/L    Glucose 91 70 - 99 mg/dL    BUN 6 5 - 18 mg/dL    Creatinine 0.38 0.21 - 0.65 mg/dL     Glomerular Filtration Rate      BUN/ Creatinine Ratio 16 7 - 25    Calcium 8.5 8.0 - 11.0 mg/dL   Basic Metabolic Panel    Collection Time: 09/03/22  7:01 AM   Result Value Ref Range    Fasting Status      Sodium 128 (L) 135 - 145 mmol/L    Potassium 4.3 3.4 - 5.1 mmol/L    Chloride 99 97 - 110 mmol/L    Carbon Dioxide 23 21 - 32 mmol/L    Anion Gap 10 7 - 19 mmol/L    Glucose 94 70 - 99 mg/dL    BUN 7 5 - 18 mg/dL    Creatinine 0.37 0.21 - 0.65 mg/dL    Glomerular Filtration Rate      BUN/ Creatinine Ratio 19 7 - 25    Calcium 8.7 8.0 - 11.0 mg/dL   Basic Metabolic Panel    Collection Time: 09/03/22  4:02 PM   Result Value Ref Range    Fasting Status      Sodium 126 (L) 135 - 145 mmol/L    Potassium 4.7 3.4 - 5.1 mmol/L    Chloride 95 (L) 97 - 110 mmol/L    Carbon Dioxide 27 21 - 32 mmol/L    Anion Gap 9 7 - 19 mmol/L    Glucose 121 (H) 70 - 99 mg/dL    BUN 8 5 - 18 mg/dL    Creatinine 0.32 0.21 - 0.65 mg/dL    Glomerular Filtration Rate      BUN/ Creatinine Ratio 25 7 - 25    Calcium 8.9 8.0 - 11.0 mg/dL   Gold Top    Collection Time: 09/03/22  4:02 PM   Result Value Ref Range    Extra Tube Hold for Add Ons    Basic Metabolic Panel    Collection Time: 09/03/22  9:55 PM   Result Value Ref Range    Fasting Status      Sodium 129 (L) 135 - 145 mmol/L    Potassium 5.0 3.4 - 5.1 mmol/L    Chloride 98 97 - 110 mmol/L    Carbon Dioxide 28 21 - 32 mmol/L    Anion Gap 8 7 - 19 mmol/L    Glucose 92 70 - 99 mg/dL    BUN 7 5 - 18 mg/dL    Creatinine 0.38 0.21 - 0.65 mg/dL    Glomerular Filtration Rate      BUN/ Creatinine Ratio 18 7 - 25    Calcium 9.2 8.0 - 11.0 mg/dL   Basic Metabolic Panel    Collection Time: 09/04/22  8:24 AM   Result Value Ref Range    Fasting Status      Sodium 128 (L) 135 - 145 mmol/L    Potassium 4.5 3.4 - 5.1 mmol/L    Chloride 97 97 - 110 mmol/L    Carbon Dioxide 25 21 - 32 mmol/L    Anion Gap 11 7 - 19 mmol/L    Glucose 83 70 - 99 mg/dL    BUN 7 5 - 18 mg/dL    Creatinine 0.49 0.21 - 0.65 mg/dL     Glomerular Filtration Rate      BUN/ Creatinine Ratio 14 7 - 25    Calcium 9.1 8.0 - 11.0 mg/dL   Comprehensive Metabolic Panel    Collection Time: 09/06/22 10:55 AM   Result Value Ref Range    Fasting Status      Sodium 124 (L) 135 - 145 mmol/L    Potassium 4.8 3.4 - 5.1 mmol/L    Chloride 94 (L) 97 - 110 mmol/L    Carbon Dioxide 19 (L) 21 - 32 mmol/L    Anion Gap 16 7 - 19 mmol/L    Glucose 119 (H) 70 - 99 mg/dL    BUN 6 5 - 18 mg/dL    Creatinine 0.36 0.21 - 0.65 mg/dL    Glomerular Filtration Rate      BUN/ Creatinine Ratio 17 7 - 25    Calcium 9.4 8.0 - 11.0 mg/dL    Bilirubin, Total 0.4 0.2 - 1.4 mg/dL    GOT/AST 11 10 - 55 Units/L    GPT/ALT 20 10 - 30 Units/L    Alkaline Phosphatase 163 150 - 360 Units/L    Albumin 4.2 3.6 - 5.1 g/dL    Protein, Total 7.4 6.0 - 8.0 g/dL    Globulin 3.2 2.0 - 4.0 g/dL    A/G Ratio 1.3 1.0 - 2.4   Osmolality    Collection Time: 09/06/22 10:55 AM   Result Value Ref Range    Osmolality 258 (L) 275 - 300 mOsm/kg   CBC with Automated Differential (performable only)    Collection Time: 09/06/22 10:55 AM   Result Value Ref Range    WBC 11.6 5.0 - 14.5 K/mcL    RBC 4.71 3.90 - 5.30 mil/mcL    HGB 14.3 11.5 - 15.5 g/dL    HCT 39.7 35.0 - 45.0 %    MCV 84.3 77.0 - 95.0 fl    MCH 30.4 25.0 - 33.0 pg    MCHC 36.0 31.0 - 37.0 g/dL    RDW-CV 12.5 11.0 - 15.0 %    RDW-SD 38.0 35.0 - 47.0 fL     140 - 450 K/mcL    NRBC 0 <=0 /100 WBC    Neutrophil, Percent 57 %    Lymphocytes, Percent 36 %    Mono, Percent 7 %    Eosinophils, Percent 0 %    Basophils, Percent 0 %    Immature Granulocytes 0 %    Absolute Neutrophils 6.6 1.5 - 8.0 K/mcL    Absolute Lymphocytes 4.1 1.5 - 6.8 K/mcL    Absolute Monocytes 0.8 0.0 - 0.8 K/mcL    Absolute Eosinophils  0.0 0.0 - 0.5 K/mcL    Absolute Basophils 0.0 0.0 - 0.2 K/mcL    Absolute Immmature Granulocytes 0.1 0.0 - 0.2 K/mcL   Rapid SARS-CoV-2 by PCR    Collection Time: 09/06/22  3:56 PM    Specimen: Nasal, Mid-turbinate; Swab   Result Value Ref  Range    Rapid SARS-COV-2 by PCR Not Detected Not Detected / Detected / Presumptive Positive / Inhibitors present    Isolation Guidelines      Procedural Comment     Basic Metabolic Panel    Collection Time: 09/07/22  5:10 PM   Result Value Ref Range    Fasting Status      Sodium 129 (L) 135 - 145 mmol/L    Potassium 3.5 3.4 - 5.1 mmol/L    Chloride 102 97 - 110 mmol/L    Carbon Dioxide 19 (L) 21 - 32 mmol/L    Anion Gap 12 7 - 19 mmol/L    Glucose 85 70 - 99 mg/dL    BUN 6 5 - 18 mg/dL    Creatinine 0.36 0.21 - 0.65 mg/dL    Glomerular Filtration Rate      BUN/ Creatinine Ratio 17 7 - 25    Calcium 8.3 8.0 - 11.0 mg/dL   Basic Metabolic Panel    Collection Time: 09/07/22  9:48 PM   Result Value Ref Range    Fasting Status      Sodium 123 (L) 135 - 145 mmol/L    Potassium 4.0 3.4 - 5.1 mmol/L    Chloride 95 (L) 97 - 110 mmol/L    Carbon Dioxide 18 (L) 21 - 32 mmol/L    Anion Gap 14 7 - 19 mmol/L    Glucose 112 (H) 70 - 99 mg/dL    BUN 3 (L) 5 - 18 mg/dL    Creatinine 0.27 0.21 - 0.65 mg/dL    Glomerular Filtration Rate      BUN/ Creatinine Ratio 11 7 - 25    Calcium 8.4 8.0 - 11.0 mg/dL   Basic Metabolic Panel    Collection Time: 09/08/22  1:46 AM   Result Value Ref Range    Fasting Status      Sodium 128 (L) 135 - 145 mmol/L    Potassium 4.5 3.4 - 5.1 mmol/L    Chloride 98 97 - 110 mmol/L    Carbon Dioxide 23 21 - 32 mmol/L    Anion Gap 12 7 - 19 mmol/L    Glucose 106 (H) 70 - 99 mg/dL    BUN 3 (L) 5 - 18 mg/dL    Creatinine 0.24 0.21 - 0.65 mg/dL    Glomerular Filtration Rate      BUN/ Creatinine Ratio 13 7 - 25    Calcium 8.2 8.0 - 11.0 mg/dL   Basic Metabolic Panel    Collection Time: 09/08/22  6:45 AM   Result Value Ref Range    Fasting Status      Sodium 128 (L) 135 - 145 mmol/L    Potassium 4.6 3.4 - 5.1 mmol/L    Chloride 97 97 - 110 mmol/L    Carbon Dioxide 23 21 - 32 mmol/L    Anion Gap 13 7 - 19 mmol/L    Glucose 92 70 - 99 mg/dL    BUN 2 (L) 5 - 18 mg/dL    Creatinine 0.24 0.21 - 0.65 mg/dL    Glomerular  Filtration Rate      BUN/ Creatinine Ratio 8 7 - 25    Calcium 8.5 8.0 - 11.0 mg/dL   Basic Metabolic Panel    Collection Time: 09/08/22 12:59 PM   Result Value Ref Range    Fasting Status      Sodium 125 (L) 135 - 145 mmol/L    Potassium 4.0 3.4 - 5.1 mmol/L    Chloride 95 (L) 97 - 110 mmol/L    Carbon Dioxide 23 21 - 32 mmol/L    Anion Gap 11 7 - 19 mmol/L    Glucose 129 (H) 70 - 99 mg/dL    BUN 2 (L) 5 - 18 mg/dL    Creatinine 0.24 0.21 - 0.65 mg/dL    Glomerular Filtration Rate      BUN/ Creatinine Ratio 8 7 - 25    Calcium 7.9 (L) 8.0 - 11.0 mg/dL   Basic Metabolic Panel    Collection Time: 09/08/22  7:01 PM   Result Value Ref Range    Fasting Status      Sodium 124 (L) 135 - 145 mmol/L    Potassium 4.3 3.4 - 5.1 mmol/L    Chloride 92 (L) 97 - 110 mmol/L    Carbon Dioxide 24 21 - 32 mmol/L    Anion Gap 12 7 - 19 mmol/L    Glucose 95 70 - 99 mg/dL    BUN <2 (L) 5 - 18 mg/dL    Creatinine 0.32 0.21 - 0.65 mg/dL    Glomerular Filtration Rate      BUN/ Creatinine Ratio      Calcium 8.7 8.0 - 11.0 mg/dL   Basic Metabolic Panel    Collection Time: 09/09/22  2:11 AM   Result Value Ref Range    Fasting Status      Sodium 128 (L) 135 - 145 mmol/L    Potassium 3.4 3.4 - 5.1 mmol/L    Chloride 99 97 - 110 mmol/L    Carbon Dioxide 20 (L) 21 - 32 mmol/L    Anion Gap 12 7 - 19 mmol/L    Glucose 143 (H) 70 - 99 mg/dL    BUN 2 (L) 5 - 18 mg/dL    Creatinine 0.24 0.21 - 0.65 mg/dL    Glomerular Filtration Rate      BUN/ Creatinine Ratio 8 7 - 25    Calcium 7.8 (L) 8.0 - 11.0 mg/dL   Basic Metabolic Panel    Collection Time: 09/09/22  8:21 AM   Result Value Ref Range    Fasting Status      Sodium 133 (L) 135 - 145 mmol/L    Potassium 4.1 3.4 - 5.1 mmol/L    Chloride 104 97 - 110 mmol/L    Carbon Dioxide 21 21 - 32 mmol/L    Anion Gap 12 7 - 19 mmol/L    Glucose 108 (H) 70 - 99 mg/dL    BUN <2 (L) 5 - 18 mg/dL    Creatinine 0.24 0.21 - 0.65 mg/dL    Glomerular Filtration Rate      BUN/ Creatinine Ratio      Calcium 7.9 (L) 8.0 -  11.0 mg/dL   Basic Metabolic Panel    Collection Time: 09/09/22 11:37 AM   Result Value Ref Range    Fasting Status      Sodium 124 (L) 135 - 145 mmol/L    Potassium 4.7 3.4 - 5.1 mmol/L    Chloride 92 (L) 97 - 110 mmol/L    Carbon Dioxide 24 21 - 32 mmol/L    Anion Gap 13 7 - 19 mmol/L    Glucose 109 (H) 70 - 99 mg/dL    BUN 3 (L) 5 - 18 mg/dL    Creatinine 0.25 0.21 - 0.65 mg/dL    Glomerular Filtration Rate      BUN/ Creatinine Ratio 12 7 - 25    Calcium 8.4 8.0 - 11.0 mg/dL   Basic Metabolic Panel    Collection Time: 09/09/22  4:20 PM   Result Value Ref Range    Fasting Status      Sodium 128 (L) 135 - 145 mmol/L    Potassium 4.3 3.4 - 5.1 mmol/L    Chloride 97 97 - 110 mmol/L    Carbon Dioxide 25 21 - 32 mmol/L    Anion Gap 10 7 - 19 mmol/L    Glucose 98 70 - 99 mg/dL    BUN 3 (L) 5 - 18 mg/dL    Creatinine 0.34 0.21 - 0.65 mg/dL    Glomerular Filtration Rate      BUN/ Creatinine Ratio 9 7 - 25    Calcium 8.5 8.0 - 11.0 mg/dL   Basic Metabolic Panel    Collection Time: 09/10/22 12:04 AM   Result Value Ref Range    Fasting Status      Sodium 124 (L) 135 - 145 mmol/L    Potassium 4.6 3.4 - 5.1 mmol/L    Chloride 93 (L) 97 - 110 mmol/L    Carbon Dioxide 22 21 - 32 mmol/L    Anion Gap 14 7 - 19 mmol/L    Glucose 89 70 - 99 mg/dL    BUN <2 (L) 5 - 18 mg/dL    Creatinine 0.23 0.21 - 0.65 mg/dL    Glomerular Filtration Rate      BUN/ Creatinine Ratio      Calcium 8.5 8.0 - 11.0 mg/dL   Basic Metabolic Panel    Collection Time: 09/10/22  4:03 AM   Result Value Ref Range    Fasting Status      Sodium 123 (L) 135 - 145 mmol/L    Potassium 4.5 3.4 - 5.1 mmol/L    Chloride 93 (L) 97 - 110 mmol/L    Carbon Dioxide 25 21 - 32 mmol/L    Anion Gap 10 7 - 19 mmol/L    Glucose 90 70 - 99 mg/dL    BUN 2 (L) 5 - 18 mg/dL    Creatinine 0.31 0.21 - 0.65 mg/dL    Glomerular Filtration Rate      BUN/ Creatinine Ratio 6 (L) 7 - 25    Calcium 8.6 8.0 - 11.0 mg/dL   Basic Metabolic Panel    Collection Time: 09/10/22  8:11 AM   Result  Value Ref Range    Fasting Status      Sodium 126 (L) 135 - 145 mmol/L    Potassium 4.2 3.4 - 5.1 mmol/L    Chloride 94 (L) 97 - 110 mmol/L    Carbon Dioxide 26 21 - 32 mmol/L    Anion Gap 10 7 - 19 mmol/L    Glucose 114 (H) 70 - 99 mg/dL    BUN 4 (L) 5 - 18 mg/dL    Creatinine 0.28 0.21 - 0.65 mg/dL    Glomerular Filtration Rate      BUN/ Creatinine Ratio 14 7 - 25    Calcium 8.2 8.0 - 11.0 mg/dL   Basic Metabolic Panel    Collection Time: 09/10/22 10:09 AM   Result Value Ref Range    Fasting Status      Sodium 124 (L) 135 - 145 mmol/L    Potassium 4.1 3.4 - 5.1 mmol/L    Chloride 93 (L) 97 - 110 mmol/L    Carbon Dioxide 24 21 - 32 mmol/L    Anion Gap 11 7 - 19 mmol/L    Glucose 103 (H) 70 - 99 mg/dL    BUN 4 (L) 5 - 18 mg/dL    Creatinine 0.34 0.21 - 0.65 mg/dL    Glomerular Filtration Rate      BUN/ Creatinine Ratio 12 7 - 25    Calcium 9.0 8.0 - 11.0 mg/dL   Basic Metabolic Panel    Collection Time: 09/10/22  4:27 PM   Result Value Ref Range    Fasting Status      Sodium 135 135 - 145 mmol/L    Potassium 2.9 (L) 3.4 - 5.1 mmol/L    Chloride 111 (H) 97 - 110 mmol/L    Carbon Dioxide 20 (L) 21 - 32 mmol/L    Anion Gap 7 7 - 19 mmol/L    Glucose 65 (L) 70 - 99 mg/dL    BUN 4 (L) 5 - 18 mg/dL    Creatinine 0.18 (L) 0.21 - 0.65 mg/dL    Glomerular Filtration Rate      BUN/ Creatinine Ratio 22 7 - 25    Calcium 5.6 (LL) 8.0 - 11.0 mg/dL   Basic Metabolic Panel    Collection Time: 09/10/22  5:16 PM   Result Value Ref Range    Fasting Status      Sodium 129 (L) 135 - 145 mmol/L    Potassium 4.7 3.4 - 5.1 mmol/L    Chloride 98 97 - 110 mmol/L    Carbon Dioxide 25 21 - 32 mmol/L    Anion Gap 11 7 - 19 mmol/L    Glucose 90 70 - 99 mg/dL    BUN 5 5 - 18 mg/dL    Creatinine 0.28 0.21 - 0.65 mg/dL    Glomerular Filtration Rate      BUN/ Creatinine Ratio 18 7 - 25    Calcium 8.7 8.0 - 11.0 mg/dL   Basic Metabolic Panel    Collection Time: 09/10/22  8:38 PM   Result Value Ref Range    Fasting Status      Sodium 127 (L) 135 -  145 mmol/L    Potassium 3.9 3.4 - 5.1 mmol/L    Chloride 95 (L) 97 - 110 mmol/L    Carbon Dioxide 23 21 - 32 mmol/L    Anion Gap 13 7 - 19 mmol/L    Glucose 115 (H) 70 - 99 mg/dL    BUN 4 (L) 5 - 18 mg/dL    Creatinine 0.25 0.21 - 0.65 mg/dL    Glomerular Filtration Rate      BUN/ Creatinine Ratio 16 7 - 25    Calcium 8.0 8.0 - 11.0 mg/dL   Basic Metabolic Panel    Collection Time: 09/11/22 12:20 AM   Result Value Ref Range    Fasting Status      Sodium 125 (L) 135 - 145 mmol/L    Potassium 4.0 3.4 - 5.1 mmol/L    Chloride 95 (L) 97 - 110 mmol/L    Carbon Dioxide 23 21 - 32 mmol/L    Anion Gap 11 7 - 19 mmol/L    Glucose 88 70 - 99 mg/dL    BUN 2 (L) 5 - 18 mg/dL    Creatinine 0.23 0.21 - 0.65 mg/dL    Glomerular Filtration Rate      BUN/ Creatinine Ratio 9 7 - 25    Calcium 8.2 8.0 - 11.0 mg/dL   Basic Metabolic Panel    Collection Time: 09/11/22  4:34 AM   Result Value Ref Range    Fasting Status      Sodium 122 (L) 135 - 145 mmol/L    Potassium 4.4 3.4 - 5.1 mmol/L    Chloride 93 (L) 97 - 110 mmol/L    Carbon Dioxide 23 21 - 32 mmol/L    Anion Gap 10 7 - 19 mmol/L    Glucose 99 70 - 99 mg/dL    BUN <2 (L) 5 - 18 mg/dL    Creatinine 0.28 0.21 - 0.65 mg/dL    Glomerular Filtration Rate      BUN/ Creatinine Ratio      Calcium 8.5 8.0 - 11.0 mg/dL   Urinalysis With Microscopy Exam W/O C/S    Collection Time: 09/11/22  8:36 AM   Result Value Ref Range    COLOR, URINALYSIS Colorless     APPEARANCE, URINALYSIS Clear     GLUCOSE, URINALYSIS Negative Negative mg/dL    BILIRUBIN, URINALYSIS Negative Negative    KETONES, URINALYSIS Negative Negative mg/dL    SPECIFIC GRAVITY, URINALYSIS 1.011 1.005 - 1.030    OCCULT BLOOD, URINALYSIS Negative Negative    PH, URINALYSIS >8.5 (H) 5.0 - 7.0    PROTEIN, URINALYSIS Negative Negative mg/dL    UROBILINOGEN, URINALYSIS 0.2 0.2, 1.0 mg/dL    NITRITE, URINALYSIS Negative Negative    LEUKOCYTE ESTERASE, URINALYSIS Negative Negative    SQUAMOUS EPITHELIAL, URINALYSIS None Seen None  Seen, 1 to 5 /hpf    ERYTHROCYTES, URINALYSIS None Seen None Seen, 1 to 2 /hpf    LEUKOCYTES, URINALYSIS 1 to 5 None Seen, 1 to 5 /hpf    BACTERIA, URINALYSIS None Seen None Seen /hpf    HYALINE CASTS, URINALYSIS None Seen None Seen, 1 to 5 /lpf    MUCUS Present    Sodium, Urine    Collection Time: 09/11/22  8:36 AM   Result Value Ref Range    Sodium, Urine 313 mmol/L   Osmolality, Urine    Collection Time: 09/11/22  8:36 AM   Result Value Ref Range    Osmolality, Urine 637 50 - 1,200 mOsm/kg   Osmolality    Collection Time: 09/11/22  8:36 AM   Result Value Ref Range    Osmolality 302 (H) 275 - 300 mOsm/kg   Basic Metabolic Panel    Collection Time: 09/11/22  8:36 AM   Result Value Ref Range    Fasting Status      Sodium 149 (H) 135 - 145 mmol/L    Potassium 3.9 3.4 - 5.1 mmol/L    Chloride 122 (H) 97 - 110 mmol/L    Carbon Dioxide 23 21 - 32 mmol/L    Anion Gap 8 7 - 19 mmol/L    Glucose 90 70 - 99 mg/dL    BUN 2 (L) 5 - 18 mg/dL    Creatinine 0.22 0.21 - 0.65 mg/dL    Glomerular Filtration Rate      BUN/ Creatinine Ratio 9 7 - 25    Calcium 7.3 (L) 8.0 - 11.0 mg/dL   Basic Metabolic Panel    Collection Time: 09/11/22 10:58 AM   Result Value Ref Range    Fasting Status      Sodium 128 (L) 135 - 145 mmol/L    Potassium 4.3 3.4 - 5.1 mmol/L    Chloride 98 97 - 110 mmol/L    Carbon Dioxide 26 21 - 32 mmol/L    Anion Gap 8 7 - 19 mmol/L    Glucose 102 (H) 70 - 99 mg/dL    BUN 4 (L) 5 - 18 mg/dL    Creatinine 0.26 0.21 - 0.65 mg/dL    Glomerular Filtration Rate      BUN/ Creatinine Ratio 15 7 - 25    Calcium 8.0 8.0 - 11.0 mg/dL   Basic Metabolic Panel    Collection Time: 09/11/22  4:02 PM   Result Value Ref Range    Fasting Status      Sodium 131 (L) 135 - 145 mmol/L    Potassium 4.0 3.4 - 5.1 mmol/L    Chloride 101 97 - 110 mmol/L    Carbon Dioxide 24 21 - 32 mmol/L    Anion Gap 10 7 - 19 mmol/L    Glucose 116 (H) 70 - 99 mg/dL    BUN 4 (L) 5 - 18 mg/dL    Creatinine 0.21 0.21 - 0.65 mg/dL    Glomerular Filtration  Rate      BUN/ Creatinine Ratio 19 7 - 25    Calcium 8.1 8.0 - 11.0 mg/dL   Basic Metabolic Panel    Collection Time: 09/11/22  8:00 PM   Result Value Ref Range    Fasting Status      Sodium 134 (L) 135 - 145 mmol/L    Potassium 3.4 3.4 - 5.1 mmol/L    Chloride 105 97 - 110 mmol/L    Carbon Dioxide 22 21 - 32 mmol/L    Anion Gap 10 7 - 19 mmol/L    Glucose 102 (H) 70 - 99 mg/dL    BUN <2 (L) 5 - 18 mg/dL    Creatinine 0.20 (L) 0.21 - 0.65 mg/dL    Glomerular Filtration Rate      BUN/ Creatinine Ratio      Calcium 8.1 8.0 - 11.0 mg/dL   Basic Metabolic Panel    Collection Time: 09/11/22 11:58 PM   Result Value Ref Range    Fasting Status      Sodium 132 (L) 135 - 145 mmol/L    Potassium 3.2 (L) 3.4 - 5.1 mmol/L    Chloride 103 97 - 110 mmol/L    Carbon Dioxide 20 (L) 21 - 32 mmol/L    Anion Gap 12 7 - 19 mmol/L    Glucose 102 (H) 70 - 99 mg/dL    BUN <2 (L) 5 - 18 mg/dL    Creatinine 0.23 0.21 - 0.65 mg/dL    Glomerular Filtration Rate      BUN/ Creatinine Ratio      Calcium 8.0 8.0 - 11.0 mg/dL   Basic Metabolic Panel    Collection Time: 09/12/22  4:14 AM   Result Value Ref Range    Fasting Status      Sodium 129 (L) 135 - 145 mmol/L    Potassium 4.4 3.4 - 5.1 mmol/L    Chloride 101 97 - 110 mmol/L    Carbon Dioxide 22 21 - 32 mmol/L    Anion Gap 10 7 - 19 mmol/L    Glucose 106 (H) 70 - 99 mg/dL    BUN <2 (L) 5 - 18 mg/dL    Creatinine 0.29 0.21 - 0.65 mg/dL    Glomerular Filtration Rate      BUN/ Creatinine Ratio      Calcium 8.8 8.0 - 11.0 mg/dL   Basic Metabolic Panel    Collection Time: 09/12/22 10:43 AM   Result Value Ref Range    Fasting Status      Sodium 124 (L) 135 - 145 mmol/L    Potassium 3.9 3.4 - 5.1 mmol/L    Chloride 95 (L) 97 - 110 mmol/L    Carbon Dioxide 19 (L) 21 - 32 mmol/L    Anion Gap 14 7 - 19 mmol/L    Glucose 99 70 - 99 mg/dL    BUN 2 (L) 5 - 18 mg/dL    Creatinine 0.32 0.21 - 0.65 mg/dL    Glomerular Filtration Rate      BUN/ Creatinine Ratio 6 (L) 7 - 25    Calcium 9.0 8.0 - 11.0 mg/dL    Basic Metabolic Panel    Collection Time: 09/13/22  9:52 AM   Result Value Ref Range    Fasting Status      Sodium 125 (L) 135 - 145 mmol/L    Potassium 4.1 3.4 - 5.1 mmol/L    Chloride 94 (L) 97 - 110 mmol/L    Carbon Dioxide 20 (L) 21 - 32 mmol/L    Anion Gap 15 7 - 19 mmol/L    Glucose 99 70 - 99 mg/dL    BUN 4 (L) 5 - 18 mg/dL    Creatinine 0.34 0.21 - 0.65 mg/dL    Glomerular Filtration Rate      BUN/ Creatinine Ratio 12 7 - 25    Calcium 8.8 8.0 - 11.0 mg/dL   Urinalysis With Microscopy Exam W/O C/S    Collection Time: 09/14/22  8:20 AM   Result Value Ref Range    COLOR, URINALYSIS Straw     APPEARANCE, URINALYSIS Clear     GLUCOSE, URINALYSIS Negative Negative mg/dL    BILIRUBIN, URINALYSIS Negative Negative    KETONES, URINALYSIS Negative Negative mg/dL    SPECIFIC GRAVITY, URINALYSIS 1.013 1.005 - 1.030    OCCULT BLOOD, URINALYSIS Negative Negative    PH, URINALYSIS 8.0 (H) 5.0 - 7.0    PROTEIN, URINALYSIS Negative Negative mg/dL    UROBILINOGEN, URINALYSIS 0.2 0.2, 1.0 mg/dL    NITRITE, URINALYSIS Negative Negative    LEUKOCYTE ESTERASE, URINALYSIS Negative Negative    SQUAMOUS EPITHELIAL, URINALYSIS 1 to 5 None Seen, 1 to 5 /hpf    ERYTHROCYTES, URINALYSIS 1 to 2 None Seen, 1 to 2 /hpf    LEUKOCYTES, URINALYSIS 1 to 5 None Seen, 1 to 5 /hpf    BACTERIA, URINALYSIS None Seen None Seen /hpf    HYALINE CASTS, URINALYSIS None Seen None Seen, 1 to 5 /lpf    MUCUS Present    Basic Metabolic Panel    Collection Time: 09/14/22  9:45 AM   Result Value Ref Range    Fasting Status      Sodium 120 (L) 135 - 145 mmol/L    Potassium 4.4 3.4 - 5.1 mmol/L    Chloride 92 (L) 97 - 110 mmol/L    Carbon Dioxide 21 21 - 32 mmol/L    Anion Gap 11 7 - 19 mmol/L    Glucose 81 70 - 99 mg/dL    BUN 5 5 - 18 mg/dL    Creatinine 0.26 0.21 - 0.65 mg/dL    Glomerular Filtration Rate      BUN/ Creatinine Ratio 19 7 - 25    Calcium 8.2 8.0 - 11.0 mg/dL   Basic Metabolic Panel    Collection Time: 09/14/22 11:44 AM   Result Value Ref  Range    Fasting Status      Sodium 123 (L) 135 - 145 mmol/L    Potassium 3.8 3.4 - 5.1 mmol/L    Chloride 94 (L) 97 - 110 mmol/L    Carbon Dioxide 21 21 - 32 mmol/L    Anion Gap 12 7 - 19 mmol/L    Glucose 102 (H) 70 - 99 mg/dL    BUN 7 5 - 18 mg/dL    Creatinine 0.26 0.21 - 0.65 mg/dL    Glomerular Filtration Rate      BUN/ Creatinine Ratio 27 (H) 7 - 25    Calcium 7.4 (L) 8.0 - 11.0 mg/dL   Phosphorus    Collection Time: 09/21/22 10:12 AM   Result Value Ref Range    Phosphorus 2.7 (L) 3.7 - 5.6 mg/dL   Magnesium    Collection Time: 09/21/22 10:12 AM   Result Value Ref Range    Magnesium 1.8 1.7 - 2.4 mg/dL   Hepatic Function Panel    Collection Time: 09/21/22 10:12 AM   Result Value Ref Range    Albumin 3.7 3.6 - 5.1 g/dL    Bilirubin, Total 0.5 0.2 - 1.4 mg/dL    Bilirubin, Direct 0.1 0.0 - 0.3 mg/dL    Alkaline Phosphatase 97 (L) 150 - 360 Units/L    GPT/ALT 35 (H) 10 - 30 Units/L    GOT/AST 13 10 - 55 Units/L    Protein, Total 6.7 6.0 - 8.0 g/dL   Basic Metabolic Panel    Collection Time: 09/21/22 10:12 AM   Result Value Ref Range    Fasting Status      Sodium 119 (LL) 135 - 145 mmol/L    Potassium 4.3 3.4 - 5.1 mmol/L    Chloride 90 (L) 97 - 110 mmol/L    Carbon Dioxide 19 (L) 21 - 32 mmol/L    Anion Gap 14 7 - 19 mmol/L    Glucose 78 70 - 99 mg/dL    BUN 6 5 - 18 mg/dL    Creatinine 0.22 0.21 - 0.65 mg/dL    Glomerular Filtration Rate      BUN/ Creatinine Ratio 27 (H) 7 - 25    Calcium 8.8 8.0 - 11.0 mg/dL   CBC with Automated Differential (performable only)    Collection Time: 09/21/22 10:12 AM   Result Value Ref Range    WBC 6.4 5.0 - 14.5 K/mcL    RBC 3.97 3.90 - 5.30 mil/mcL    HGB 12.3 11.5 - 15.5 g/dL    HCT 33.2 (L) 35.0 - 45.0 %    MCV 83.6 77.0 - 95.0 fl    MCH 31.0 25.0 - 33.0 pg    MCHC 37.0 31.0 - 37.0 g/dL    RDW-CV 13.7 11.0 - 15.0 %    RDW-SD 41.7 35.0 - 47.0 fL     140 - 450 K/mcL    NRBC 0 <=0 /100 WBC    Neutrophil, Percent 70 %    Lymphocytes, Percent 22 %    Mono, Percent 7 %     Eosinophils, Percent 0 %    Basophils, Percent 0 %    Immature Granulocytes 1 %    Absolute Neutrophils 4.5 1.5 - 8.0 K/mcL    Absolute Lymphocytes 1.4 (L) 1.5 - 6.8 K/mcL    Absolute Monocytes 0.5 0.0 - 0.8 K/mcL    Absolute Eosinophils  0.0 0.0 - 0.5 K/mcL    Absolute Basophils 0.0 0.0 - 0.2 K/mcL    Absolute Immmature Granulocytes 0.0 0.0 - 0.2 K/mcL   BASIC METABOLIC PANEL    Collection Time: 09/22/22 10:00 AM   Result Value Ref Range    Fasting Status      Sodium 120 (L) 135 - 145 mmol/L    Potassium 3.8 3.4 - 5.1 mmol/L    Chloride 88 (L) 97 - 110 mmol/L    Carbon Dioxide 20 (L) 21 - 32 mmol/L    Anion Gap 16 7 - 19 mmol/L    Glucose 83 70 - 99 mg/dL    BUN 5 5 - 18 mg/dL    Creatinine 0.31 0.21 - 0.65 mg/dL    Glomerular Filtration Rate      BUN/ Creatinine Ratio 16 7 - 25    Calcium 8.4 8.0 - 11.0 mg/dL   Basic Metabolic Panel    Collection Time: 09/23/22 10:30 PM   Result Value Ref Range    Fasting Status      Sodium 122 (L) 135 - 145 mmol/L    Potassium 4.0 3.4 - 5.1 mmol/L    Chloride 90 (L) 97 - 110 mmol/L    Carbon Dioxide 24 21 - 32 mmol/L    Anion Gap 12 7 - 19 mmol/L    Glucose 82 70 - 99 mg/dL    BUN 8 5 - 18 mg/dL    Creatinine 0.29 0.21 - 0.65 mg/dL    Glomerular Filtration Rate      BUN/ Creatinine Ratio 28 (H) 7 - 25    Calcium 8.8 8.0 - 11.0 mg/dL   Magnesium    Collection Time: 09/23/22 10:30 PM   Result Value Ref Range    Magnesium 1.7 1.7 - 2.4 mg/dL   Osmolality    Collection Time: 09/23/22 10:30 PM   Result Value Ref Range    Osmolality 248 (L) 275 - 300 mOsm/kg   Sodium    Collection Time: 09/24/22  2:16 AM   Result Value Ref Range    Sodium 126 (L) 135 - 145 mmol/L   Phosphorus    Collection Time: 09/24/22  6:22 AM   Result Value Ref Range    Phosphorus 4.0 3.7 - 5.6 mg/dL   Sodium    Collection Time: 09/24/22  6:22 AM   Result Value Ref Range    Sodium 127 (L) 135 - 145 mmol/L   Osmolality    Collection Time: 09/24/22 12:21 PM   Result Value Ref Range    Osmolality 248 (L) 275 - 300  mOsm/kg   Sodium    Collection Time: 09/24/22 12:21 PM   Result Value Ref Range    Sodium 122 (L) 135 - 145 mmol/L   Sodium    Collection Time: 09/24/22  6:31 PM   Result Value Ref Range    Sodium 133 (L) 135 - 145 mmol/L   Osmolality    Collection Time: 09/24/22  6:31 PM   Result Value Ref Range    Osmolality 269 (L) 275 - 300 mOsm/kg   Sodium    Collection Time: 09/25/22  6:23 AM   Result Value Ref Range    Sodium 128 (L) 135 - 145 mmol/L   Osmolality    Collection Time: 09/25/22  6:23 AM   Result Value Ref Range    Osmolality 259 (L) 275 - 300 mOsm/kg   Sodium    Collection Time: 09/25/22  6:16 PM   Result Value Ref Range    Sodium 130 (L) 135 - 145 mmol/L   Osmolality    Collection Time: 09/25/22  6:16 PM   Result Value Ref Range    Osmolality 268 (L) 275 - 300 mOsm/kg   Sodium    Collection Time: 09/26/22  6:11 AM   Result Value Ref Range    Sodium 125 (L) 135 - 145 mmol/L   Osmolality    Collection Time: 09/26/22  6:11 AM   Result Value Ref Range    Osmolality 260 (L) 275 - 300 mOsm/kg   Sodium    Collection Time: 09/26/22 10:24 AM   Result Value Ref Range    Sodium 120 (L) 135 - 145 mmol/L   Phosphorus    Collection Time: 09/28/22  9:17 AM   Result Value Ref Range    Phosphorus 2.0 (L) 3.7 - 5.6 mg/dL   Magnesium    Collection Time: 09/28/22  9:17 AM   Result Value Ref Range    Magnesium 1.6 (L) 1.7 - 2.4 mg/dL   Hepatic Function Panel    Collection Time: 09/28/22  9:17 AM   Result Value Ref Range    Albumin 3.7 3.6 - 5.1 g/dL    Bilirubin, Total 0.4 0.2 - 1.4 mg/dL    Bilirubin, Direct <0.1 0.0 - 0.3 mg/dL    Alkaline Phosphatase 81 (L) 150 - 360 Units/L    GPT/ALT 36 (H) 10 - 30 Units/L    GOT/AST 11 10 - 55 Units/L    Protein, Total 6.8 6.0 - 8.0 g/dL   Basic Metabolic Panel    Collection Time: 09/28/22  9:17 AM   Result Value Ref Range    Fasting Status      Sodium 119 (LL) 135 - 145 mmol/L    Potassium 3.5 3.4 - 5.1 mmol/L    Chloride 90 (L) 97 - 110 mmol/L    Carbon Dioxide 19 (L) 21 - 32 mmol/L     Anion Gap 14 7 - 19 mmol/L    Glucose 103 (H) 70 - 99 mg/dL    BUN 4 (L) 5 - 18 mg/dL    Creatinine 0.24 0.21 - 0.65 mg/dL    Glomerular Filtration Rate      BUN/ Creatinine Ratio 17 7 - 25    Calcium 8.9 8.0 - 11.0 mg/dL   CBC with Automated Differential (performable only)    Collection Time: 09/28/22  9:17 AM   Result Value Ref Range    WBC 7.8 5.0 - 14.5 K/mcL    RBC 4.03 3.90 - 5.30 mil/mcL    HGB 12.2 11.5 - 15.5 g/dL    HCT 34.0 (L) 35.0 - 45.0 %    MCV 84.4 77.0 - 95.0 fl    MCH 30.3 25.0 - 33.0 pg    MCHC 35.9 31.0 - 37.0 g/dL    RDW-CV 13.5 11.0 - 15.0 %    RDW-SD 41.6 35.0 - 47.0 fL     140 - 450 K/mcL    NRBC 0 <=0 /100 WBC    Neutrophil, Percent 45 %    Lymphocytes, Percent 49 %    Mono, Percent 5 %    Eosinophils, Percent 1 %    Basophils, Percent 0 %    Immature Granulocytes 0 %    Absolute Neutrophils 3.5 1.5 - 8.0 K/mcL    Absolute Lymphocytes 3.8 1.5 - 6.8 K/mcL    Absolute Monocytes 0.4 0.0 - 0.8 K/mcL    Absolute Eosinophils  0.1 0.0 - 0.5 K/mcL    Absolute Basophils 0.0 0.0 - 0.2 K/mcL    Absolute Immmature Granulocytes 0.0 0.0 - 0.2 K/mcL   URINALYSIS DIPSTICK ONLY    Collection Time: 09/28/22 11:17 AM   Result Value Ref Range    COLOR, URINALYSIS Yellow     APPEARANCE, URINALYSIS Clear     GLUCOSE, URINALYSIS Negative Negative mg/dL    BILIRUBIN, URINALYSIS Negative Negative    KETONES, URINALYSIS Negative Negative mg/dL    SPECIFIC GRAVITY, URINALYSIS 1.015 1.005 - 1.030    OCCULT BLOOD, URINALYSIS Negative Negative    PH, URINALYSIS 7.0 5.0 - 7.0    PROTEIN, URINALYSIS Negative Negative mg/dL    UROBILINOGEN, URINALYSIS 0.2 0.2, 1.0 mg/dL    NITRITE, URINALYSIS Negative Negative    LEUKOCYTE ESTERASE, URINALYSIS Negative Negative   Phosphorus    Collection Time: 10/05/22  9:29 AM   Result Value Ref Range    Phosphorus 2.3 (L) 3.7 - 5.6 mg/dL   Magnesium    Collection Time: 10/05/22  9:29 AM   Result Value Ref Range    Magnesium 1.7 1.7 - 2.4 mg/dL   Hepatic Function Panel    Collection  Time: 10/05/22  9:29 AM   Result Value Ref Range    Albumin 3.8 3.6 - 5.1 g/dL    Bilirubin, Total 0.1 (L) 0.2 - 1.4 mg/dL    Bilirubin, Direct <0.1 0.0 - 0.3 mg/dL    Alkaline Phosphatase 74 (L) 150 - 360 Units/L    GPT/ALT 43 (H) 10 - 30 Units/L    GOT/AST 16 10 - 55 Units/L    Protein, Total 6.8 6.0 - 8.0 g/dL   Basic Metabolic Panel    Collection Time: 10/05/22  9:29 AM   Result Value Ref Range    Fasting Status      Sodium 123 (L) 135 - 145 mmol/L    Potassium 3.5 3.4 - 5.1 mmol/L    Chloride 90 (L) 97 - 110 mmol/L    Carbon Dioxide 22 21 - 32 mmol/L    Anion Gap 15 7 - 19 mmol/L    Glucose 91 70 - 99 mg/dL    BUN 3 (L) 5 - 18 mg/dL    Creatinine 0.24 0.21 - 0.65 mg/dL    Glomerular Filtration Rate      BUN/ Creatinine Ratio 13 7 - 25    Calcium 9.1 8.0 - 11.0 mg/dL   CBC with Automated Differential (performable only)    Collection Time: 10/05/22  9:29 AM   Result Value Ref Range    WBC 9.3 5.0 - 14.5 K/mcL    RBC 4.23 3.90 - 5.30 mil/mcL    HGB 12.3 11.5 - 15.5 g/dL    HCT 34.9 (L) 35.0 - 45.0 %    MCV 82.5 77.0 - 95.0 fl    MCH 29.1 25.0 - 33.0 pg    MCHC 35.2 31.0 - 37.0 g/dL    RDW-CV 13.6 11.0 - 15.0 %    RDW-SD 40.8 35.0 - 47.0 fL     140 - 450 K/mcL    NRBC 0 <=0 /100 WBC    Neutrophil, Percent 40 %    Lymphocytes, Percent 54 %    Mono, Percent 6 %    Eosinophils, Percent 0 %    Basophils, Percent 0 %    Immature Granulocytes 0 %    Absolute Neutrophils 3.7 1.5 - 8.0 K/mcL    Absolute Lymphocytes 5.0 1.5 - 6.8 K/mcL    Absolute Monocytes 0.5 0.0 - 0.8 K/mcL    Absolute Eosinophils  0.0 0.0 - 0.5 K/mcL    Absolute Basophils 0.0 0.0 - 0.2 K/mcL    Absolute Immmature Granulocytes 0.0 0.0 - 0.2 K/mcL   Phosphorus    Collection Time: 10/12/22  9:15 AM   Result Value Ref Range    Phosphorus 2.9 (L) 3.7 - 5.6 mg/dL   Magnesium    Collection Time: 10/12/22  9:15 AM   Result Value Ref Range    Magnesium 1.7 1.7 - 2.4 mg/dL   Hepatic Function Panel    Collection Time: 10/12/22  9:15 AM   Result Value Ref  Range    Albumin 3.7 3.6 - 5.1 g/dL    Bilirubin, Total <0.1 (L) 0.2 - 1.4 mg/dL    Bilirubin, Direct <0.1 0.0 - 0.3 mg/dL    Alkaline Phosphatase 72 (L) 150 - 360 Units/L    GPT/ALT 44 (H) 10 - 30 Units/L    GOT/AST 15 10 - 55 Units/L    Protein, Total 6.5 6.0 - 8.0 g/dL   Basic Metabolic Panel    Collection Time: 10/12/22  9:15 AM   Result Value Ref Range    Fasting Status      Sodium 124 (L) 135 - 145 mmol/L    Potassium 3.9 3.4 - 5.1 mmol/L    Chloride 93 (L) 97 - 110 mmol/L    Carbon Dioxide 24 21 - 32 mmol/L    Anion Gap 11 7 - 19 mmol/L    Glucose 87 70 - 99 mg/dL    BUN 5 5 - 18 mg/dL    Creatinine 0.28 0.21 - 0.65 mg/dL    Glomerular Filtration Rate      BUN/ Creatinine Ratio 18 7 - 25    Calcium 9.3 8.0 - 11.0 mg/dL   CBC with Automated Differential (performable only)    Collection Time: 10/12/22  9:15 AM   Result Value Ref Range    WBC 9.6 5.0 - 14.5 K/mcL    RBC 4.05 3.90 - 5.30 mil/mcL    HGB 12.0 11.5 - 15.5 g/dL    HCT 33.6 (L) 35.0 - 45.0 %    MCV 83.0 77.0 - 95.0 fl    MCH 29.6 25.0 - 33.0 pg    MCHC 35.7 31.0 - 37.0 g/dL    RDW-CV 13.6 11.0 - 15.0 %    RDW-SD 41.0 35.0 - 47.0 fL     140 - 450 K/mcL    NRBC 0 <=0 /100 WBC    Neutrophil, Percent 39 %    Lymphocytes, Percent 53 %    Mono, Percent 8 %    Eosinophils, Percent 0 %    Basophils, Percent 0 %    Immature Granulocytes 0 %    Absolute Neutrophils 3.8 1.5 - 8.0 K/mcL    Absolute Lymphocytes 5.0 1.5 - 6.8 K/mcL    Absolute Monocytes 0.8 0.0 - 0.8 K/mcL    Absolute Eosinophils  0.0 0.0 - 0.5 K/mcL    Absolute Basophils 0.0 0.0 - 0.2 K/mcL    Absolute Immmature Granulocytes 0.0 0.0 - 0.2 K/mcL   URINALYSIS DIPSTICK ONLY    Collection Time: 10/12/22 11:01 AM   Result Value Ref Range    COLOR, URINALYSIS Colorless     APPEARANCE, URINALYSIS Clear     GLUCOSE, URINALYSIS Negative Negative mg/dL    BILIRUBIN, URINALYSIS Negative Negative    KETONES, URINALYSIS Negative Negative mg/dL    SPECIFIC GRAVITY, URINALYSIS <1.005 (L) 1.005 - 1.030     OCCULT BLOOD, URINALYSIS Negative Negative    PH, URINALYSIS 8.0 (H) 5.0 - 7.0    PROTEIN, URINALYSIS Negative Negative mg/dL    UROBILINOGEN, URINALYSIS 0.2 0.2, 1.0 mg/dL    NITRITE, URINALYSIS Negative Negative    LEUKOCYTE ESTERASE, URINALYSIS Negative Negative     *Note: Due to a large number of results and/or encounters for the requested time period, some results have not been displayed. A complete set of results can be found in Results Review.       Assessment and Plan:  Cornelius was seen today for office visit and follow-up.    Diagnoses and all orders for this visit:    Secondary hypertension    Cerebral salt-wasting    Hyponatremia    Other proteinuria        ASSESSMENT:  1.  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  She has recently been admitted to [facility] on two occasions with hyponatremia.  Cornelius Rivera has had hyponatremia since early  September 2022.  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   She has been prescribed oral sodium chloride,  Sodium chloride 4 meq/ml,  45 ml q 12 hours =  180 meq q 12 hours,   usually taken at about 6 AM and 6 PM, however, apparently  Cornelius did not receive oral sodium chloride on the morning of admission on 11.07.2022.    Cornelius has also been prescribed intravenous sodium chloride, 3 % sodium chloride, 70 ml / hour x 12 hours = 840 ml per day or about 420 meq / day sodium chloride.   However,  Cornelius actually receives between seven and twelve hours of the 3 % sodium chloride infusion.   On 11.02.2022, the last time she had labs in oncology infusion center, she had received seven hours of the 3 % sodium chloride.     Date                          Serum  Sodium                                   (meq/liter)  10.19.2022               128  10.27.2022               133  11.02.2022               143  11.07.2022               132    11.07.2022  CBC  WBC 9  K, HGB  10.8 GM/DL, HCT  32 %,   K   2.  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   Serum sodium level was normal on 11.02.2022,   serum sodium level was 143 meq/liter on 11.02.2022.    Serum sodium level was 132 meq/liter on 11.07.2022 PM, reportedly  Cornelius did not receive her oral AM dose of sodium chloride and received seven hours of the night time 3 % sodium chloride infusion.  There may be improvement in the cerebral salt wasting / SIADH secretion.    3.  11.07.2022  Urine sodium 157 meq/liter - this is elevated for a patient with hyponatremia  Urine sodium should be less than 20 meq / liter for a patient with hyponatremia  However,  Cornelius had just received oral sodium prior to collecting the urine specimen  4.  11.08.2022  Cornelius is awake and eating breakfast at the time of the  Pediatric  Nephrology visit.  5.  11.07.2022 - 11.08.2022, blood pressures 120 - 153 / 52 - 114 mm   Hg   Blood pressures elevated   6.  11.08.2022  Labs  11.08.2022   6  AM  Sodium 141, potassium 4.3, chloride 114, bicarbonate 22,  BUN 5 mg/dl, creatinine  0.44 mg/dl, calcium 9.1 mg/dl  Serum osmolality 292 milliosmoles / kg water         Date /  Time                         Serum Sodium         Serum Osmolality                                               (meq/liter)                 (milliosmoles / kg water)  11.08.2022/06:16                141                           292  11.08.2022/11:45                136                           282  11.08.2022/18:37                136                           280  11.09.2022/06:08                135                           288     Cornelius is now prescribed sodium chloride 25 ml  X  4 meq/ml q 12 hours = 100 meq q 12 hours  Alternatively,  Cornelius may receive sodium chloride 1000 mg tablet sodium chloride x 6 tablets q 12 hours  Alternatively,  Cornelius may have 1 and 1/4 teaspoon of table salt added to food or taken directly from the spoon q  12 hours.            7.  11.10.2022  Labs  11.10.2022  Sodium 134, potassium 4.0, chloride 104, bicarbonate 21,  BUN 8 mg/dl, creatinine  0.33 mg/dl, calcium 9.6 mg/dl  Serum osmolality 281 milliosmoles / kg water   Urine protein  40 mg/dl  Urine creatinine  43 mg/dl  Urine protein to creatinine ratio 0.930   Urine analysis  11.10.2022  Urine sp gravity  1.011  Urine pH 7  Urine 30 mg/dl protein, otherwise, negative on dipstick examination  Urine negative on microscopic examination of the urine sediment  8.  11.10.2022  Proteinuria and hypertension most likely secondary to chemotherapy  9.  11.10.2022  Cornelius is currently receiving sodium chloride  1000 mg tablet x 6 tablets BID =   sodium chloride 6000 mg BID   10.  11.07.2022  Labs 11.07.2022  Serum aldosterone   4.8 ng/dl   Renin activity 0.2 ng/ml/hour    11. 11.16.2022  Blood pressures were  144 - 155 / 110 - 116 mm  Hg on 11.16.2022 .  12. 11.16.2022  Sodium 136, potassium 4.3, chloride 107, bicarbonate 23,  BUN 6 mg/dl, creatinine  0.43 mg/dl, calcium 9.4 mg/dl,    total protein 6.7 gm/dl, albumin  3.7 gm/dl  11.16.2022  Urine analysis  11.16.2022  Urine sp gravity 1.011  Urine pH 7  Urine 30 mg/dl protein, otherwise, negative on dipstick examination  Urine negative on microscopic examination of the urine sediment  13. 11.16.2022  Blood pressures continue to be elevated, will increase losartan to 25 mg BID  14. 11.16.2022  Serum sodium level at lower limits of normal (Cornelius did not receive sodium in AM of 11.16.2022)  Continue current sodium supplementation,  6 grams sodium chloride BID = 6 x 1 gram tablets BID        RECOMMENDATIONS:  1.  Check renin activity and serum aldosterone level - done   2.  Check BMP and serum osmolality in two weeks   3.  Cornelius is currently receiving sodium chloride  1000 mg tablet x 6 tablets BID =   sodium chloride 6000 mg BID   4.  Continue to hold the overnight infusion of 3 % sodium chloride   5.  Losartan 25 mg BID.    6.  Continue all other oral medications   7.  Follow up in two weeks     I reviewed the above recommendations with patient/family who expressed understanding and agreed with this plan.    Thank you very much for allowing me to participate in the care of this patient. If you have any questions, please do not hesitate to contact me.     Kwasi Hunter MD  Pediatric Nephrology  [facility]/ [facility]       - - -

## 2023-02-28 NOTE — ED PROVIDER NOTE - PROGRESS NOTE DETAILS
Sophia DO: Patient with concern for cystitis on CT scan- rec urine sample- patient with no urinary complaints and adamantly does not want to provide urine; offered straight cath- refused; would not treat urine without sample and without symptoms at this time; patient ambulated in ED- requesting d/c home; strict return precautions given.

## 2023-02-28 NOTE — ED ADULT NURSE NOTE - OBJECTIVE STATEMENT
Pt is a 82 YOF complaining of back and groin pain. Pt states "its been getting worse for a week my neighbor made me come in". Pt is GCS 15, A & O x4, PMS x 4, HEENT clear, PERRL. Pt denies chest pain or SOB. Pt rates pain 5/10. 20 g IV inserted R AC labs drawn, medications given, fluids started. Pt safety is maintained bed is in lowest position semi fowlers w bed rails up and call bell within reach.

## 2023-02-28 NOTE — ED ADULT NURSE NOTE - ED CARDIAC CAPILLARY REFILL
Size Of Lesion: 1 x 0.4 cm Body Location Override (Optional - Billing Will Still Be Based On Selected Body Map Location If Applicable): left lower back Detail Level: Detailed 2 seconds or less

## 2023-02-28 NOTE — ED ADULT NURSE NOTE - SUICIDE SCREENING QUESTION 2
No
You can access the FollowMyHealth Patient Portal offered by [facility] by registering at the following website: [email or web address]. By joining [facility]’s FollowMyHealth portal, you will also be able to view your health information using other applications (apps) compatible with our system.

## 2023-02-28 NOTE — ED PROVIDER NOTE - CARE PROVIDER_API CALL
Adams Brush)  Orthopaedic Surgery  [address] [place] [address]  Phone: [phone]  Fax: [phone]  Follow Up Time:

## 2023-02-28 NOTE — ED ADULT TRIAGE NOTE - CHIEF COMPLAINT QUOTE
BIB EMS C/O BACK PAIN RADIATING UP HER BACK AND DOWN HER LEG. PT AMBULATED DOWN STAIRS TO EMS. PT A&O X 4 SPEAKING IN FULL SENTENCES C/O PAIN 10/10. DENIES CP/SOB/N/V/D/FEVER/CHILLS. PMH HBP, EPILEPSY.

## 2023-03-01 ENCOUNTER — EMERGENCY (EMERGENCY)
Facility: HOSPITAL | Age: 83
LOS: 0 days | Discharge: HOME | End: 2023-03-01
Attending: EMERGENCY MEDICINE
Payer: MEDICARE

## 2023-03-01 VITALS
HEART RATE: 85 BPM | TEMPERATURE: 98 F | OXYGEN SATURATION: 96 % | RESPIRATION RATE: 16 BRPM | SYSTOLIC BLOOD PRESSURE: 107 MMHG | DIASTOLIC BLOOD PRESSURE: 62 MMHG

## 2023-03-01 VITALS
SYSTOLIC BLOOD PRESSURE: 137 MMHG | RESPIRATION RATE: 18 BRPM | DIASTOLIC BLOOD PRESSURE: 78 MMHG | HEART RATE: 75 BPM | OXYGEN SATURATION: 95 % | TEMPERATURE: 99 F

## 2023-03-01 DIAGNOSIS — Z86.39 PERSONAL HISTORY OF OTHER ENDOCRINE, NUTRITIONAL AND METABOLIC DISEASE: ICD-10-CM

## 2023-03-01 DIAGNOSIS — E78.5 HYPERLIPIDEMIA, UNSPECIFIED: ICD-10-CM

## 2023-03-01 DIAGNOSIS — G40.909 EPILEPSY, UNSPECIFIED, NOT INTRACTABLE, WITHOUT STATUS EPILEPTICUS: ICD-10-CM

## 2023-03-01 DIAGNOSIS — M54.50 LOW BACK PAIN, UNSPECIFIED: ICD-10-CM

## 2023-03-01 DIAGNOSIS — R10.31 RIGHT LOWER QUADRANT PAIN: ICD-10-CM

## 2023-03-01 DIAGNOSIS — G89.29 OTHER CHRONIC PAIN: ICD-10-CM

## 2023-03-01 DIAGNOSIS — R10.30 LOWER ABDOMINAL PAIN, UNSPECIFIED: ICD-10-CM

## 2023-03-01 DIAGNOSIS — E78.00 PURE HYPERCHOLESTEROLEMIA, UNSPECIFIED: ICD-10-CM

## 2023-03-01 DIAGNOSIS — Z90.89 ACQUIRED ABSENCE OF OTHER ORGANS: ICD-10-CM

## 2023-03-01 DIAGNOSIS — I10 ESSENTIAL (PRIMARY) HYPERTENSION: ICD-10-CM
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
## 2023-03-01 DIAGNOSIS — S32.501A UNSPECIFIED FRACTURE OF RIGHT PUBIS, INITIAL ENCOUNTER FOR CLOSED FRACTURE: ICD-10-CM

## 2023-03-01 DIAGNOSIS — Z20.822 CONTACT WITH AND (SUSPECTED) EXPOSURE TO COVID-19: ICD-10-CM

## 2023-03-01 DIAGNOSIS — Z90.89 ACQUIRED ABSENCE OF OTHER ORGANS: Chronic | ICD-10-CM

## 2023-03-01 DIAGNOSIS — N39.0 URINARY TRACT INFECTION, SITE NOT SPECIFIED: ICD-10-CM

## 2023-03-01 DIAGNOSIS — M25.561 PAIN IN RIGHT KNEE: ICD-10-CM

## 2023-03-01 DIAGNOSIS — M19.90 UNSPECIFIED OSTEOARTHRITIS, UNSPECIFIED SITE: ICD-10-CM

## 2023-03-01 DIAGNOSIS — M25.551 PAIN IN RIGHT HIP: ICD-10-CM
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
## 2023-03-01 LAB
APPEARANCE UR: ABNORMAL
BACTERIA # UR AUTO: ABNORMAL
BILIRUB UR-MCNC: NEGATIVE — SIGNIFICANT CHANGE UP
COLOR SPEC: YELLOW — SIGNIFICANT CHANGE UP
COMMENT - URINE: SIGNIFICANT CHANGE UP
DIFF PNL FLD: ABNORMAL
EPI CELLS # UR: SIGNIFICANT CHANGE UP
FLUAV AG NPH QL: SIGNIFICANT CHANGE UP
FLUBV AG NPH QL: SIGNIFICANT CHANGE UP
GLUCOSE UR QL: NEGATIVE — SIGNIFICANT CHANGE UP
KETONES UR-MCNC: ABNORMAL
LEUKOCYTE ESTERASE UR-ACNC: ABNORMAL
NITRITE UR-MCNC: POSITIVE
PH UR: 6 — SIGNIFICANT CHANGE UP (ref 5–8)
PROT UR-MCNC: 30 MG/DL
RBC CASTS # UR COMP ASSIST: ABNORMAL /HPF (ref 0–4)
RSV RNA NPH QL NAA+NON-PROBE: SIGNIFICANT CHANGE UP
SARS-COV-2 RNA SPEC QL NAA+PROBE: SIGNIFICANT CHANGE UP
SP GR SPEC: 1.02 — SIGNIFICANT CHANGE UP (ref 1.01–1.02)
UROBILINOGEN FLD QL: NEGATIVE — SIGNIFICANT CHANGE UP
WBC UR QL: ABNORMAL /HPF (ref 0–5)

## 2023-03-01 PROCEDURE — 72170 X-RAY EXAM OF PELVIS: CPT | Mod: 26,59

## 2023-03-01 PROCEDURE — 87186 SC STD MICRODIL/AGAR DIL: CPT

## 2023-03-01 PROCEDURE — 87086 URINE CULTURE/COLONY COUNT: CPT

## 2023-03-01 PROCEDURE — 97162 PT EVAL MOD COMPLEX 30 MIN: CPT | Mod: GP

## 2023-03-01 PROCEDURE — 73503 X-RAY EXAM HIP UNI 4/> VIEWS: CPT | Mod: RT

## 2023-03-01 PROCEDURE — 97116 GAIT TRAINING THERAPY: CPT | Mod: GP

## 2023-03-01 PROCEDURE — 99285 EMERGENCY DEPT VISIT HI MDM: CPT

## 2023-03-01 PROCEDURE — 93971 EXTREMITY STUDY: CPT | Mod: RT

## 2023-03-01 PROCEDURE — 93971 EXTREMITY STUDY: CPT | Mod: 26,RT

## 2023-03-01 PROCEDURE — 81001 URINALYSIS AUTO W/SCOPE: CPT

## 2023-03-01 PROCEDURE — 99282 EMERGENCY DEPT VISIT SF MDM: CPT

## 2023-03-01 PROCEDURE — 72170 X-RAY EXAM OF PELVIS: CPT

## 2023-03-01 PROCEDURE — 0241U: CPT

## 2023-03-01 PROCEDURE — 87077 CULTURE AEROBIC IDENTIFY: CPT

## 2023-03-01 PROCEDURE — 99285 EMERGENCY DEPT VISIT HI MDM: CPT | Mod: 25

## 2023-03-01 PROCEDURE — 73503 X-RAY EXAM HIP UNI 4/> VIEWS: CPT | Mod: 26,RT

## 2023-03-01 RX ORDER — CEPHALEXIN 500 MG
500 CAPSULE ORAL ONCE
Refills: 0 | Status: COMPLETED | OUTPATIENT
Start: 2023-03-01 | End: 2023-03-01

## 2023-03-01 RX ORDER — OXYCODONE HYDROCHLORIDE 5 MG/1
5 TABLET ORAL ONCE
Refills: 0 | Status: DISCONTINUED | OUTPATIENT
Start: 2023-03-01 | End: 2023-03-01

## 2023-03-01 RX ORDER — CEPHALEXIN 500 MG
1 CAPSULE ORAL
Qty: 14 | Refills: 0
Start: 2023-03-01 | End: 2023-03-07

## 2023-03-01 RX ORDER — ACETAMINOPHEN 500 MG
650 TABLET ORAL ONCE
Refills: 0 | Status: COMPLETED | OUTPATIENT
Start: 2023-03-01 | End: 2023-03-01

## 2023-03-01 RX ORDER — OXYCODONE HYDROCHLORIDE 5 MG/1
1 TABLET ORAL
Qty: 8 | Refills: 0
Start: 2023-03-01 | End: 2023-03-02

## 2023-03-01 RX ADMIN — OXYCODONE HYDROCHLORIDE 5 MILLIGRAM(S): 5 TABLET ORAL at 13:57

## 2023-03-01 RX ADMIN — Medication 500 MILLIGRAM(S): at 17:04

## 2023-03-01 RX ADMIN — Medication 650 MILLIGRAM(S): at 13:58

## 2023-03-01 NOTE — ED PROVIDER NOTE - NSFOLLOWUPINSTRUCTIONS_ED_ALL_ED_FT
1. return for worsening symptoms or anything concerning to you  2. take all home meds as prescribed  3. follow up with your pmd call to make an appointment  4. use walker for ambulation  5. participate in home PT  6. take keflex as directed  7. follow up with ortho    Fracture    A fracture is a break in one of your bones. This can occur from a variety of injuries, especially traumatic ones. Symptoms include pain, bruising, or swelling. Do not use the injured limb. If a fracture is in one of the bones below your waist, do not put weight on that limb unless instructed to do so by your healthcare provider. Crutches or a cane may have been provided. A splint or cast may have been applied by your health care provider. Make sure to keep it dry and follow up with an orthopedist as instructed.    SEEK IMMEDIATE MEDICAL CARE IF YOU HAVE ANY OF THE FOLLOWING SYMPTOMS: numbness, tingling, increasing pain, or weakness in any part of the injured limb.    Urinary Tract Infection, Adult  ImageA urinary tract infection (UTI) is an infection of any part of the urinary tract, which includes the kidneys, ureters, bladder, and urethra. These organs make, store, and get rid of urine in the body. UTI can be a bladder infection (cystitis) or kidney infection (pyelonephritis).    What are the causes?  This infection may be caused by fungi, viruses, or bacteria. Bacteria are the most common cause of UTIs. This condition can also be caused by repeated incomplete emptying of the bladder during urination.    What increases the risk?  This condition is more likely to develop if:    You ignore your need to urinate or hold urine for long periods of time.  You do not empty your bladder completely during urination.  You wipe back to front after urinating or having a bowel movement, if you are female.  You are uncircumcised, if you are male.  You are constipated.  You have a urinary catheter that stays in place (indwelling).  You have a weak defense (immune) system.  You have a medical condition that affects your bowels, kidneys, or bladder.  You have diabetes.  You take antibiotic medicines frequently or for long periods of time, and the antibiotics no longer work well against certain types of infections (antibiotic resistance).  You take medicines that irritate your urinary tract.  You are exposed to chemicals that irritate your urinary tract.  You are female.    What are the signs or symptoms?  Symptoms of this condition include:    Fever.  Frequent urination or passing small amounts of urine frequently.  Needing to urinate urgently.  Pain or burning with urination.  Urine that smells bad or unusual.  Cloudy urine.  Pain in the lower abdomen or back.  Trouble urinating.  Blood in the urine.  Vomiting or being less hungry than normal.  Diarrhea or abdominal pain.  Vaginal discharge, if you are female.    How is this diagnosed?  This condition is diagnosed with a medical history and physical exam. You will also need to provide a urine sample to test your urine. Other tests may be done, including:    Blood tests.  Sexually transmitted disease (STD) testing.    If you have had more than one UTI, a cystoscopy or imaging studies may be done to determine the cause of the infections.    How is this treated?  Treatment for this condition often includes a combination of two or more of the following:    Antibiotic medicine.  Other medicines to treat less common causes of UTI.  Over-the-counter medicines to treat pain.  Drinking enough water to stay hydrated.    Follow these instructions at home:  Take over-the-counter and prescription medicines only as told by your health care provider.  If you were prescribed an antibiotic, take it as told by your health care provider. Do not stop taking the antibiotic even if you start to feel better.  Avoid alcohol, caffeine, tea, and carbonated beverages. They can irritate your bladder.  Drink enough fluid to keep your urine clear or pale yellow.  Keep all follow-up visits as told by your health care provider. This is important.  ImageMake sure to:    Empty your bladder often and completely. Do not hold urine for long periods of time.  Empty your bladder before and after sex.  Wipe from front to back after a bowel movement if you are female. Use each tissue one time when you wipe.    Contact a health care provider if:  You have back pain.  You have a fever.  You feel nauseous or vomit.  Your symptoms do not get better after 3 days.  Your symptoms go away and then return.  Get help right away if:  You have severe back pain or lower abdominal pain.  You are vomiting and cannot keep down any medicines or water.  This information is not intended to replace advice given to you by your health care provider. Make sure you discuss any questions you have with your health care provider.

## 2023-03-01 NOTE — ED PROVIDER NOTE - CARE PROVIDER_API CALL
Lopez Santos)  Orthopaedic Surgery; Surgery of the Hand  [address], [address]  [place] [address]  Phone: [phone]  Fax: [phone]  Follow Up Time: 1-3 Days   Shad Grajeda (DO)  Orthopaedic Surgery  [address] [place] [address]  Phone: [phone]  Fax: [phone]  Follow Up Time:

## 2023-03-01 NOTE — PHYSICAL THERAPY INITIAL EVALUATION ADULT - PERTINENT HX OF CURRENT PROBLEM, REHAB EVAL
81 yo F presents to the ED c/o R pelvic pain.  X-rays (+) for Right pubic rami fxs and tiny sacral ala fx. Patient refusing [facility] referral.

## 2023-03-01 NOTE — ED PROVIDER NOTE - SHIFT CHANGE DETAILS
pt signed out to Dr. Mireles pending PT/SW/Ortho and reassess. Albert Dawkins M.D., Attending Physician

## 2023-03-01 NOTE — ED ADULT TRIAGE NOTE - CHIEF COMPLAINT QUOTE
pt presents to ED due to complaints of right groin and right hip pain pt states she was seen in  ED yesterday with the same complaints dx with sciatica and today states pain is unbearable

## 2023-03-01 NOTE — ED PROVIDER NOTE - NS_ ATTENDINGSCRIBEDETAILS _ED_A_ED_FT
I, Albert Dawkins MD,  performed the initial face to face bedside interview with this patient regarding history of present illness, review of symptoms and relevant past medical, social and family history.  I completed an independent physical examination.  I was the initial provider who evaluated this patient.   I personally saw the patient and performed a substantive portion of the visit including all aspects of the medical decision making.  The history, relevant review of systems, past medical and surgical history, medical decision making, and physical examination was documented by the scribe in my presence and I attest to the accuracy of the documentation.

## 2023-03-01 NOTE — PHYSICAL THERAPY INITIAL EVALUATION ADULT - MODALITIES TREATMENT COMMENTS
Patient returned to [facility] silva rails up. O2 sat monitor returned with sats at 80%, patient refused to put O2 back on.  Patient wants to be discharged back home ASAP so she can smoke. NELLI, RN informed of session/status.

## 2023-03-01 NOTE — ED PROVIDER NOTE - DIFFERENTIAL DIAGNOSIS
Differential Diagnosis Likely sciatica, doubt occult since pt did not have any falls or trauma, low suspicion for DVT

## 2023-03-01 NOTE — ED ADULT NURSE NOTE - NSIMPLEMENTINTERV_GEN_ALL_ED
Implemented All Fall Risk Interventions:  [place] to call system. Call bell, personal items and telephone within reach. Instruct patient to call for assistance. Room bathroom lighting operational. Non-slip footwear when patient is off stretcher. Physically safe environment: no spills, clutter or unnecessary equipment. Stretcher in lowest position, wheels locked, appropriate side rails in place. Provide visual cue, wrist band, yellow gown, etc. Monitor gait and stability. Monitor for mental status changes and reorient to person, place, and time. Review medications for side effects contributing to fall risk. Reinforce activity limits and safety measures with patient and family.

## 2023-03-01 NOTE — ED PROVIDER NOTE - PATIENT PORTAL LINK FT
You can access the FollowMyHealth Patient Portal offered by [facility] by registering at the following website: [email or web address]. By joining [employer]’s FollowMyHealth portal, you will also be able to view your health information using other applications (apps) compatible with our system.

## 2023-03-01 NOTE — ED PROVIDER NOTE - OBJECTIVE STATEMENT
81 y/o female with PMHx of sciatica, epilepsy, HTN, HLD, obesity, arthritis presents to the ED c/o worsening right groin pain, states she was here yesterday as well, but now the pain is worsening. Denies any recent falls or trauma, denies dysuria. States that today her right knee is also hurting and she is unable to walk due to pain. Pt is a smoker, does not drink.

## 2023-03-01 NOTE — ED ADULT NURSE REASSESSMENT NOTE - NS ED NURSE REASSESS COMMENT FT1
straight cath done 300cc of concentrated Elena urine returned U/A  C&S sent.
Patient evaluated by PT ambulated.  Keflex to be given for UTI.  Patient to be discharged home.

## 2023-03-01 NOTE — ED PROVIDER NOTE - PHYSICAL EXAMINATION
Constitutional: NAD AAOx3  Eyes: PERRLA EOMI  Head: Normocephalic atraumatic  Mouth: MMM  Cardiac: regular rate   Resp: Lungs CTAB  GI: Abd s/nt/nd  Neuro: CN2-12 intact  Skin: No visible rashes  MSK: No mid line spinal tenderness, +SLR right, +mild tenderness right groin, no bony tenderness to the hip or pelvis

## 2023-03-01 NOTE — CONSULT NOTE ADULT - SUBJECTIVE AND OBJECTIVE BOX
82y Female community ambulator presents c/o Right hip pain for past few weeks exacerbated now from walking up flights of stairs. She says she fell about a month ago but didnt have much pain. Now she has increased pain walking and going up stairs. No alleviating factors such as medication or positioning. Denies fall or injury or trauma. Denies numbness/tingling. Denies fever/chills. Denies pain/injury elsewhere. Pt seen in ED along with Orthopedic Team/Residents/PAs.    ROS: No CP, no SOB, No night sweats, no fevers or chills, no Numbness or tingling    Family history of cancer (Father, Mother, Sibling)    MEWS Score    Epilepsy    HTN (hypertension)    Hypercholesteremia    Obesity    Osteoarthritis    Prolapsed uterus    S/P tonsillectomy    right groin leg pain    1    SysAdmin_VisitLink      PAST MEDICAL & SURGICAL HISTORY:  Epilepsy      HTN (hypertension)      Hypercholesteremia      Obesity      Osteoarthritis      Prolapsed uterus  complete prolapse      S/P tonsillectomy  age 11        MEDICATIONS  (STANDING):    Allergies    No Known Allergies    Intolerances                            12.5   11.06 )-----------( 433      ( 28 Feb 2023 12:41 )             38.4     28 Feb 2023 12:41    138    |  106    |  15     ----------------------------<  92     3.7     |  28     |  0.72     Ca    9.8        28 Feb 2023 12:41    TPro  7.9    /  Alb  3.8    /  TBili  0.3    /  DBili  x      /  AST  15     /  ALT  16     /  AlkPhos  130    28 Feb 2023 12:41      Vital Signs Last 24 Hrs  T(C): 37.1 (03-01-23 @ 15:11), Max: 37.1 (03-01-23 @ 15:11)  T(F): 98.8 (03-01-23 @ 15:11), Max: 98.8 (03-01-23 @ 15:11)  HR: 75 (03-01-23 @ 15:11) (75 - 85)  BP: 137/78 (03-01-23 @ 15:11) (107/62 - 137/78)  BP(mean): 91 (03-01-23 @ 15:11) (91 - 91)  RR: 18 (03-01-23 @ 15:11) (16 - 18)  SpO2: 95% (03-01-23 @ 15:11) (95% - 96%)    Imaging: XR pelvis right pubic rami fxs Bony CT Pelvis shows right pubic rami fx small varun fx    Physical Exam  General: NAD, Alert, Awake and oriented  Neurologic: No gross deficits, moving all 4s.  Head: NCAT without abrasions, lacerations, or ecchymosis to head, face, or scalp  Eyes: PERRL  Neck/C-Spine: FAROM. No bony TTP.  T/L Spine: No bony TTP, no palpable step-off.    HIPS and PELVIS: Able to SLR both LE    RIGHT LE:  No open skin. No deformities or other signs of trauma at hip, knee, lower leg, ankle or foot. Full baseline painless ROM at Hip, Knee, ankle and toes. Negative log-roll and heel strike. No pain on axial loading. No groin or hip  pain on Passive internal or external rotation Able to actively SLR. SILT toes 1-5. + DP/PT pulses palpable with brisk cap refill distally. Compartments soft and compressible. EHL/FHL/TA/GS intact symmetric bilaterally. No pain on axial loading.    LEFT LE:  No open skin. No deformities or other signs of trauma at hip, knee, lower leg, ankle or foot. Full baseline painless ROM at Hip, Knee, ankle and toes. Negative log-roll and heel strike. No pain on axial loading. No groin or hip  pain on Passive internal or external rotation Able to actively SLR. SILT toes 1-5. + DP/PT pulses palpable with brisk cap refill distally. Compartments soft and compressible. EHL/FHL/TA/GS intact symmetric bilaterally. No pain on axial loading.    RIGHT UE: No open skin. No obvious deformities or other signs of trauma at shoulder, upper arm, elbow, forearm, wrist or hand.  Full baseline painless ROM at shoulder, elbow, wrist, and . No bony TTP. Compartments soft and compressible.     LEFT UE: No open skin. No obvious deformities or other signs of trauma at shoulder, upper arm, elbow, forearm, wrist or hand.  Full baseline painless ROM at shoulder, elbow, wrist, and . No bony TTP. Compartments soft and compressible.

## 2023-03-01 NOTE — ED PROVIDER NOTE - NS ED ROS FT
Constitutional: No fever or chills  Eyes: No visual changes  HEENT: No throat pain  CV: No chest pain  Resp: No SOB no cough  GI: No abd pain, nausea or vomiting  : No dysuria  MSK: +Right hip pain  Skin: No rash  Neuro: No headache

## 2023-03-01 NOTE — ED PROVIDER NOTE - CLINICAL SUMMARY MEDICAL DECISION MAKING FREE TEXT BOX
83 y/o female presents to the ED for right hip/groin pain. Seen in the ED yesterday and had -CT, was told she had sciatica, but now with persistent pain. Exam with tenderness to right groin and +SLR, likely sciatica, doubt occult since pt did not have any falls or trauma, low suspicion for DVT. Will do US, x-ray and reassess. 81 y/o female presents to the ED for right hip/groin pain. Seen in the ED yesterday and had -CT, was told she had sciatica, but now with persistent pain. Exam with tenderness to right groin and +SLR, likely sciatica, lower suspicion occult hip fx since pt did not have any falls or trauma, low suspicion for DVT. Will do US, x-ray and reassess.    All labs and imaging reviewed by myself.  Ultrasound no signs DVT.  X-ray of the pelvis shows superior pubic ramus fracture on my read.  I spoke with radiologist to discuss this as CT yesterday did not show fracture.  After review of the CT from yesterday patient has superior and inferior pubic ramus fracture but no other obvious fractures.  I spoke with patient at length again.  She states that the last time she fell was 1 month ago has been ambulating since however its been more difficult due to the pain.  I offered the patient subacute rehab she does not want this at this time but states she would take help at home and potentially take PT at home.  I spoke with social work they will evaluate patient I placed PT consult to evaluate the patient I also spoke with Ortho to place a weightbearing as tolerated consult note in so that the patient can get PT at home.  Awaiting the services before final disposition. 83 y/o female presents to the ED for right hip/groin pain. Seen in the ED yesterday and had -CT, was told she had sciatica, but now with persistent pain. Exam with tenderness to right groin and +SLR, likely sciatica, lower suspicion occult hip fx since pt did not have any falls or trauma, low suspicion for DVT. Will do US, x-ray and reassess.    All labs and imaging reviewed by myself.  Ultrasound no signs DVT.  X-ray of the pelvis shows superior pubic ramus fracture on my read.  I spoke with radiologist to discuss this as CT yesterday did not show fracture.  After review of the CT from yesterday patient has superior and inferior pubic ramus fracture but no other obvious fractures.  I spoke with patient at length again.  She states that the last time she fell was 1 month ago has been ambulating since however its been more difficult due to the pain.  I offered the patient subacute rehab she does not want this at this time but states she would take help at home and potentially take PT at home.  I spoke with social work they will evaluate patient I placed PT consult to evaluate the patient I also spoke with Ortho to place a weightbearing as tolerated consult note in so that the patient can get PT at home.  Awaiting the services before final disposition.    Patient seen by orthopedics and cleared for weightbearing as tolerated.  Patient seen by physical therapy and patient ambulated well and is okay for home PT.  Patient seen by social work and set up for home PT.  Patient wants to go home.  Urine with UTI.  Will discharge with follow-up and strict return precautions.

## 2023-03-01 NOTE — PHYSICAL THERAPY INITIAL EVALUATION ADULT - LEVEL OF INDEPENDENCE: STAIR NEGOTIATION, REHAB EVAL
No stairs in ED area. Instructed patient to ascend with L LE first and descend R LE first./unable to perform

## 2023-03-01 NOTE — ED PROVIDER NOTE - PROVIDER TOKENS
Linwood Long, a  at 37w3d with an CASEY of 3/29/2019, by Ultrasound, was seen at [address] [facility] for the following procedure(s): Procedure Type: JAZZY, NST]    Nonstress Test  Variability: Moderate  Decelerations: None  Accelerations: Yes  Acoustic Stimulator: No  Baseline: 135 BPM  Uterine Irritability: No  Contractions: Not present    4 Quadrant JAZZY  JAZZY Q1 (cm): 4 1 cm  JAZZY Q2 (cm): 0 8 cm  JAZZY Q3 (cm): 2 3 cm  JAZZY Q4 (cm): 3 3 cm  JAZZY TOTAL (cm): 10 5 cm  LVP (cm): 4 1 cm              Interpretation  Nonstress Test Interpretation: Reactive  Overall Impression: Reassuring PROVIDER:[TOKEN:[[id number]],FOLLOWUP:[1-3 Days]] PROVIDER:[TOKEN:[[id number]]]

## 2023-03-13 ENCOUNTER — EMERGENCY (EMERGENCY)
Facility: HOSPITAL | Age: 83
LOS: 0 days | Discharge: HOME | End: 2023-03-13
Attending: STUDENT IN AN ORGANIZED HEALTH CARE EDUCATION/TRAINING PROGRAM
Payer: MEDICARE

## 2023-03-13 VITALS
HEART RATE: 113 BPM | DIASTOLIC BLOOD PRESSURE: 68 MMHG | OXYGEN SATURATION: 93 % | SYSTOLIC BLOOD PRESSURE: 138 MMHG | RESPIRATION RATE: 18 BRPM | TEMPERATURE: 98 F

## 2023-03-13 VITALS
HEART RATE: 103 BPM | WEIGHT: 123.02 LBS | OXYGEN SATURATION: 93 % | HEIGHT: 62 IN | SYSTOLIC BLOOD PRESSURE: 133 MMHG | TEMPERATURE: 98 F | RESPIRATION RATE: 14 BRPM | DIASTOLIC BLOOD PRESSURE: 64 MMHG

## 2023-03-13 DIAGNOSIS — Z86.39 PERSONAL HISTORY OF OTHER ENDOCRINE, NUTRITIONAL AND METABOLIC DISEASE: ICD-10-CM

## 2023-03-13 DIAGNOSIS — I10 ESSENTIAL (PRIMARY) HYPERTENSION: ICD-10-CM

## 2023-03-13 DIAGNOSIS — M19.90 UNSPECIFIED OSTEOARTHRITIS, UNSPECIFIED SITE: ICD-10-CM

## 2023-03-13 DIAGNOSIS — E78.00 PURE HYPERCHOLESTEROLEMIA, UNSPECIFIED: ICD-10-CM

## 2023-03-13 DIAGNOSIS — N39.0 URINARY TRACT INFECTION, SITE NOT SPECIFIED: ICD-10-CM

## 2023-03-13 DIAGNOSIS — M54.50 LOW BACK PAIN, UNSPECIFIED: ICD-10-CM

## 2023-03-13 DIAGNOSIS — Z90.89 ACQUIRED ABSENCE OF OTHER ORGANS: ICD-10-CM

## 2023-03-13 DIAGNOSIS — Z90.89 ACQUIRED ABSENCE OF OTHER ORGANS: Chronic | ICD-10-CM

## 2023-03-13 DIAGNOSIS — G40.909 EPILEPSY, UNSPECIFIED, NOT INTRACTABLE, WITHOUT STATUS EPILEPTICUS: ICD-10-CM

## 2023-03-13 DIAGNOSIS — Z20.822 CONTACT WITH AND (SUSPECTED) EXPOSURE TO COVID-19: ICD-10-CM

## 2023-03-13 DIAGNOSIS — M54.9 DORSALGIA, UNSPECIFIED: ICD-10-CM

## 2023-03-13 LAB
ALBUMIN SERPL ELPH-MCNC: 3.2 G/DL — LOW (ref 3.3–5)
ALP SERPL-CCNC: 174 U/L — HIGH (ref 40–120)
ALT FLD-CCNC: 23 U/L — SIGNIFICANT CHANGE UP (ref 12–78)
ANION GAP SERPL CALC-SCNC: 3 MMOL/L — LOW (ref 5–17)
APTT BLD: 29.2 SEC — SIGNIFICANT CHANGE UP (ref 27.5–35.5)
AST SERPL-CCNC: 22 U/L — SIGNIFICANT CHANGE UP (ref 15–37)
BASOPHILS # BLD AUTO: 0.1 K/UL — SIGNIFICANT CHANGE UP (ref 0–0.2)
BASOPHILS NFR BLD AUTO: 0.9 % — SIGNIFICANT CHANGE UP (ref 0–2)
BILIRUB SERPL-MCNC: 0.4 MG/DL — SIGNIFICANT CHANGE UP (ref 0.2–1.2)
BUN SERPL-MCNC: 20 MG/DL — SIGNIFICANT CHANGE UP (ref 7–23)
CALCIUM SERPL-MCNC: 9.3 MG/DL — SIGNIFICANT CHANGE UP (ref 8.5–10.1)
CHLORIDE SERPL-SCNC: 112 MMOL/L — HIGH (ref 96–108)
CO2 SERPL-SCNC: 26 MMOL/L — SIGNIFICANT CHANGE UP (ref 22–31)
CREAT SERPL-MCNC: 0.63 MG/DL — SIGNIFICANT CHANGE UP (ref 0.5–1.3)
EGFR: 90 ML/MIN/1.73M2 — SIGNIFICANT CHANGE UP
EOSINOPHIL # BLD AUTO: 0.19 K/UL — SIGNIFICANT CHANGE UP (ref 0–0.5)
EOSINOPHIL NFR BLD AUTO: 1.8 % — SIGNIFICANT CHANGE UP (ref 0–6)
FLUAV AG NPH QL: SIGNIFICANT CHANGE UP
FLUBV AG NPH QL: SIGNIFICANT CHANGE UP
GLUCOSE SERPL-MCNC: 102 MG/DL — HIGH (ref 70–99)
HCT VFR BLD CALC: 36.6 % — SIGNIFICANT CHANGE UP (ref 34.5–45)
HGB BLD-MCNC: 12.2 G/DL — SIGNIFICANT CHANGE UP (ref 11.5–15.5)
IMM GRANULOCYTES NFR BLD AUTO: 0.5 % — SIGNIFICANT CHANGE UP (ref 0–0.9)
INR BLD: 0.98 RATIO — SIGNIFICANT CHANGE UP (ref 0.88–1.16)
LYMPHOCYTES # BLD AUTO: 2.15 K/UL — SIGNIFICANT CHANGE UP (ref 1–3.3)
LYMPHOCYTES # BLD AUTO: 20.1 % — SIGNIFICANT CHANGE UP (ref 13–44)
MCHC RBC-ENTMCNC: 30 PG — SIGNIFICANT CHANGE UP (ref 27–34)
MCHC RBC-ENTMCNC: 33.3 GM/DL — SIGNIFICANT CHANGE UP (ref 32–36)
MCV RBC AUTO: 90.1 FL — SIGNIFICANT CHANGE UP (ref 80–100)
MONOCYTES # BLD AUTO: 1.01 K/UL — HIGH (ref 0–0.9)
MONOCYTES NFR BLD AUTO: 9.4 % — SIGNIFICANT CHANGE UP (ref 2–14)
NEUTROPHILS # BLD AUTO: 7.2 K/UL — SIGNIFICANT CHANGE UP (ref 1.8–7.4)
NEUTROPHILS NFR BLD AUTO: 67.3 % — SIGNIFICANT CHANGE UP (ref 43–77)
PLATELET # BLD AUTO: 544 K/UL — HIGH (ref 150–400)
POTASSIUM SERPL-MCNC: 4.1 MMOL/L — SIGNIFICANT CHANGE UP (ref 3.5–5.3)
POTASSIUM SERPL-SCNC: 4.1 MMOL/L — SIGNIFICANT CHANGE UP (ref 3.5–5.3)
PROT SERPL-MCNC: 7.4 GM/DL — SIGNIFICANT CHANGE UP (ref 6–8.3)
PROTHROM AB SERPL-ACNC: 11.4 SEC — SIGNIFICANT CHANGE UP (ref 10.5–13.4)
RBC # BLD: 4.06 M/UL — SIGNIFICANT CHANGE UP (ref 3.8–5.2)
RBC # FLD: 14.3 % — SIGNIFICANT CHANGE UP (ref 10.3–14.5)
RSV RNA NPH QL NAA+NON-PROBE: SIGNIFICANT CHANGE UP
SARS-COV-2 RNA SPEC QL NAA+PROBE: SIGNIFICANT CHANGE UP
SODIUM SERPL-SCNC: 141 MMOL/L — SIGNIFICANT CHANGE UP (ref 135–145)
WBC # BLD: 10.7 K/UL — HIGH (ref 3.8–10.5)
WBC # FLD AUTO: 10.7 K/UL — HIGH (ref 3.8–10.5)

## 2023-03-13 PROCEDURE — 97116 GAIT TRAINING THERAPY: CPT | Mod: GP

## 2023-03-13 PROCEDURE — 85730 THROMBOPLASTIN TIME PARTIAL: CPT

## 2023-03-13 PROCEDURE — 80053 COMPREHEN METABOLIC PANEL: CPT

## 2023-03-13 PROCEDURE — 85025 COMPLETE CBC W/AUTO DIFF WBC: CPT

## 2023-03-13 PROCEDURE — 99285 EMERGENCY DEPT VISIT HI MDM: CPT

## 2023-03-13 PROCEDURE — 97162 PT EVAL MOD COMPLEX 30 MIN: CPT | Mod: GP

## 2023-03-13 PROCEDURE — 36415 COLL VENOUS BLD VENIPUNCTURE: CPT

## 2023-03-13 PROCEDURE — 85610 PROTHROMBIN TIME: CPT

## 2023-03-13 PROCEDURE — 99283 EMERGENCY DEPT VISIT LOW MDM: CPT

## 2023-03-13 PROCEDURE — 0241U: CPT

## 2023-03-13 RX ORDER — LOSARTAN POTASSIUM 100 MG/1
1 TABLET, FILM COATED ORAL
Qty: 0 | Refills: 0 | DISCHARGE

## 2023-03-13 RX ORDER — ZINC SULFATE TAB 220 MG (50 MG ZINC EQUIVALENT) 220 (50 ZN) MG
1 TAB ORAL
Qty: 0 | Refills: 0 | DISCHARGE

## 2023-03-13 RX ORDER — CALCIUM CARBONATE 500(1250)
1 TABLET ORAL
Qty: 0 | Refills: 0 | DISCHARGE

## 2023-03-13 RX ORDER — PREGABALIN 225 MG/1
1 CAPSULE ORAL
Qty: 0 | Refills: 0 | DISCHARGE

## 2023-03-13 RX ORDER — VITAMIN E 100 UNIT
1 CAPSULE ORAL
Qty: 0 | Refills: 0 | DISCHARGE

## 2023-03-13 RX ORDER — LAMOTRIGINE 25 MG/1
2 TABLET, ORALLY DISINTEGRATING ORAL
Qty: 0 | Refills: 0 | DISCHARGE

## 2023-03-13 RX ORDER — CHOLECALCIFEROL (VITAMIN D3) 125 MCG
1 CAPSULE ORAL
Qty: 0 | Refills: 0 | DISCHARGE

## 2023-03-13 RX ORDER — ASCORBIC ACID 60 MG
1 TABLET,CHEWABLE ORAL
Qty: 0 | Refills: 0 | DISCHARGE

## 2023-03-13 RX ORDER — ASPIRIN/CALCIUM CARB/MAGNESIUM 324 MG
1 TABLET ORAL
Qty: 0 | Refills: 0 | DISCHARGE

## 2023-03-13 RX ORDER — MAGNESIUM OXIDE 400 MG ORAL TABLET 241.3 MG
1 TABLET ORAL
Qty: 0 | Refills: 0 | DISCHARGE

## 2023-03-13 RX ORDER — LIDOCAINE 4 G/100G
1 CREAM TOPICAL ONCE
Refills: 0 | Status: COMPLETED | OUTPATIENT
Start: 2023-03-13 | End: 2023-03-13

## 2023-03-13 RX ORDER — CEPHALEXIN 500 MG
500 CAPSULE ORAL ONCE
Refills: 0 | Status: COMPLETED | OUTPATIENT
Start: 2023-03-13 | End: 2023-03-13

## 2023-03-13 RX ADMIN — LIDOCAINE 1 PATCH: 4 CREAM TOPICAL at 12:10

## 2023-03-13 RX ADMIN — Medication 500 MILLIGRAM(S): at 15:27

## 2023-03-13 NOTE — PHYSICAL THERAPY INITIAL EVALUATION ADULT - PATIENT PROFILE REVIEW, REHAB EVAL
Prior imaging significant for R sided inf/sup pubic rami fxs (mildly displaced) and "subtle double cortical line/periosteal reaction of R acetabulum concerning for acetabular fx "on Pelvic Xrays 3/1/23, chronic height loss L3 vertebra; also suggestive of cystitis /bladder wall thickening/yes

## 2023-03-13 NOTE — PHYSICAL THERAPY INITIAL EVALUATION ADULT - MARITAL STATUS
has a son Rocael living in [place] (who is not very helpful) and another son living [facility] who works at [employer] (and is more helpful ,but does not live close by )/

## 2023-03-13 NOTE — ED ADULT NURSE NOTE - OBJECTIVE STATEMENT
Pt is a 82 YOF complaining of back and hip pain. Pt has hx of pelvic fracture, HTN. Pt denies chest pain or SOB. Pt is GCS 15, HEENT clear PERRL, PMS x4, A & O x4. Pt states "im running out of my pain meds". Pt safety is maintained bed in lowest position semi fowlers with bed rails up.

## 2023-03-13 NOTE — PHYSICAL THERAPY INITIAL EVALUATION ADULT - LIVES WITH, PROFILE
pt resides in a -op with 4+7 steps to enter and 7 steps up to living area , she owns a RW that she has been using past few weeks/alone

## 2023-03-13 NOTE — PHYSICAL THERAPY INITIAL EVALUATION ADULT - GENERAL OBSERVATIONS, REHAB EVAL
long sitting reclined on stretcher dressed in street clothes including jacket ,odor of cigarette smoke on clothing detected ; awake,alert ,Ox4 c/o inability to sleep last night due to pain ,usually lies L sidelying and was unable ; had hired a HHA x 3 hours 3days /wk that was to start today

## 2023-03-13 NOTE — PHYSICAL THERAPY INITIAL EVALUATION ADULT - NSACTIVITYREC_GEN_A_PT
out of bed to chair as tolerated with RW/1PA, use lumbar roll to support lordosis as tolerated , trial heat/cold packs to affected area when Lidoderm patch not in use

## 2023-03-13 NOTE — PHYSICAL THERAPY INITIAL EVALUATION ADULT - LEVEL OF INDEPENDENCE: SUPINE/SIT, REHAB EVAL
anticipates increased pain L lumbosacral area ,+TTP over L posterior iliac crest, L SIJ/moderate assist (50% patients effort)

## 2023-03-13 NOTE — PHYSICAL THERAPY INITIAL EVALUATION ADULT - BED MOBILITY TRAINING, PT EVAL
STG: Able to roll side to side using SRs and tolerates L sidelying position with pillow between legs with improved sleep quality/decreased pain (1 week)

## 2023-03-13 NOTE — PHYSICAL THERAPY INITIAL EVALUATION ADULT - MODALITIES TREATMENT COMMENTS
pt reports she has never been a person to take pain meds including Oxycodone and Motrin but has been needing them past few weeks ,afraid of medication interactions as she takes many medications

## 2023-03-13 NOTE — PHYSICAL THERAPY INITIAL EVALUATION ADULT - GAIT TRAINING, PT EVAL
LTG: Able to amb with least restrictive assistive device (RW to cane) >300ft and ascend/descend 18 stetotal with rests as needed (2-3 weeks)

## 2023-03-13 NOTE — PHYSICAL THERAPY INITIAL EVALUATION ADULT - PLANNED THERAPY INTERVENTIONS, PT EVAL
stair-training , Pain relieving modalities L lower back/gluteal/SIJ/bed mobility training/gait training/lumbar stabilization/postural re-education/ROM/strengthening/transfer training

## 2023-03-13 NOTE — ED PROVIDER NOTE - PATIENT PORTAL LINK FT
You can access the FollowMyHealth Patient Portal offered by [facility] by registering at the following website: [email or web address]. By joining [employer]’s FollowMyHealth portal, you will also be able to view your health information using other applications (apps) compatible with our system.

## 2023-03-13 NOTE — PHYSICAL THERAPY INITIAL EVALUATION ADULT - LEVEL OF INDEPENDENCE: SCOOT/BRIDGE, REHAB EVAL
cued to self support with both hands at sides in setting of L lower back pain/contact guard/minimum assist (75% patients effort)

## 2023-03-13 NOTE — PHYSICAL THERAPY INITIAL EVALUATION ADULT - LEVEL OF INDEPENDENCE, REHAB EVAL
pt states she has been unable to lie L sidelying ,her usual sleeping posture/minimum assist (75% patients effort)

## 2023-03-13 NOTE — ED ADULT TRIAGE NOTE - CHIEF COMPLAINT QUOTE
Pt BIBEMS from home c/o lower back pain radiating toward the left side. Pt was here 1 week ago for the same complaint and was told she has a pelvic fracture. Pt pain has increased to 10/10 since working with physical therapy at home.

## 2023-03-13 NOTE — ED PROVIDER NOTE - CLINICAL SUMMARY MEDICAL DECISION MAKING FREE TEXT BOX
pt with left lower back pain, no trauma, ambulatory at home though with pain. Pt with recent right sided pelvic fracture. , likely MSK in etiology as no recent trauma. Will give pain control and re-evaluate. pt with left lower back pain, no trauma, ambulatory at home though with pain. Pt with recent right sided pelvic fracture. , likely MSK in etiology as no recent trauma. Will give pain control and re-evaluate.    2:53pm- patient pending rehab placement at this time; s/o to Dr. Henry pending transfer to rehab placement.

## 2023-03-13 NOTE — PHYSICAL THERAPY INITIAL EVALUATION ADULT - ACTIVE RANGE OF MOTION EXAMINATION, REHAB EVAL
good AROM L hip with pain reproduced on L hip FLEX >100 degrees/bilateral upper extremity Active ROM was WFL (within functional limits)/bilateral  lower extremity Active ROM was WFL (within functional limits)/deficits as listed below

## 2023-03-13 NOTE — PHYSICAL THERAPY INITIAL EVALUATION ADULT - DIAGNOSIS, PT EVAL
impaired mobility ,+intractable L sided back pain, recent ?atraumatic R pelvic fxs (sup/inf pubic rami, +/- acetabulum ) on imaging 3/1/23

## 2023-03-13 NOTE — PHYSICAL THERAPY INITIAL EVALUATION ADULT - LEVEL OF INDEPENDENCE: SIT/SUPINE, REHAB EVAL
returned to supine lying on stretcher and repositioned for comfort/minimum assist (75% patients effort)

## 2023-03-13 NOTE — ED PROVIDER NOTE - MUSCULOSKELETAL, MLM
Spine appears normal, range of motion is not limited, no muscle or joint tenderness +left sided paravertebral lumbar tenderness

## 2023-03-13 NOTE — ED PROVIDER NOTE - OBJECTIVE STATEMENT
83 y/o female with PMHx of epilepsy, HTN, HLD, obesity, osteoarthritis, prolapsed uterus presents to the ED c/o worsening left sided back pain x4 days radiating to her left side. Pt was here 1 week ago for the same complaint and told she has a right pelvic fracture. Pt has been doing physical therapy at home, but states the pain has increased to 10/10 since she started physical therapy last week. Pt has been taking Tylenol for pain at home. Pt states that she is able to ambulate on her own with her walker, but states she is having difficulty, pt lives on her own. 83 y/o female with PMHx of epilepsy, HTN, HLD, obesity, osteoarthritis, prolapsed uterus presents to the ED c/o worsening left sided back pain x4 days radiating to her left side. Pt was here 1 week ago for the same complaint and told she has a right pelvic fracture. Pt has been doing physical therapy at home, but states the pain has increased to 10/10 since she started physical therapy last week. Pt has been taking Tylenol for pain at home. Pt has oxycodone at home, but only has 1 left so has not taken it, but states that it was helping. Denies any new falls. Pt states that she is able to ambulate on her own with her walker, but states she is having difficulty, pt lives on her own. Pt states she has a home aid that was supposed to start seeing her today, but then she had worsening back pain so she came in for evaluation and treatment instead.

## 2023-03-13 NOTE — PHYSICAL THERAPY INITIAL EVALUATION ADULT - LEVEL OF INDEPENDENCE: GAIT, REHAB EVAL
cues for upright posture & safe proximity to RW  to aid maintaining upright vs stooped posture/contact guard/minimum assist (75% patients effort)

## 2023-03-13 NOTE — CHART NOTE - NSCHARTNOTEFT_GEN_A_CORE
CM met with patient at bedside, role explained. Pt resides alone and states she is having difficulty doing her own ADLs. Pt will be medically cleared from dc. DC options discussed with patient and she was agreeable to [facility]. Pt requested [facility]. Referral sent and pt was accepted. DC to [facility] from the ER, awaiting ambulance transfer. Pt states she did not take her Keflex po that was prescribed from previous ED visit. Pt states she was afraid she looked up the side effects and did not want to get a stomach ache. Urine culture showed CM met with patient at bedside, role explained. Pt resides alone and states she is having difficulty doing her own ADLs. Pt will be medically cleared from dc. DC options discussed with patient and she was agreeable to [facility]. Pt requested [facility]. Referral sent and pt was accepted. DC to [facility] from the ER, awaiting ambulance transfer. Pt states she did not take her Keflex po that was prescribed from previous ED visit. Pt states she was afraid to take it because she looked up the side effects and did not want to get a stomach ache. Urine culture showed Klebsiella pneumoniae. Per Infection Control at [facility] pt does not need to be isolated. Pt agrees to take Keflex po at [facility], it is prescribed on dc orders.  CM discussed above with [facility] intake. Pt states she contacted her son Malachi , he is aware of dc plan.

## 2023-03-13 NOTE — ED PROVIDER NOTE - NSFOLLOWUPINSTRUCTIONS_ED_ALL_ED_FT
Back Pain    WHAT YOU NEED TO KNOW:    Back pain is common. It can be caused by many conditions, such as arthritis or the breakdown of spinal discs. Your risk for back pain is increased by injuries, lack of activity, or repeated bending and twisting. You may feel sore or stiff on one or both sides of your back. The pain may spread to your buttocks or thighs.    DISCHARGE INSTRUCTIONS:    Return to the emergency department if:     You have pain, numbness, or weakness in one or both legs.      Your pain becomes so severe that you cannot walk.      You cannot control your urine or bowel movements.      You have severe back pain with chest pain.      You have severe back pain, nausea, and vomiting.      You have severe back pain that spreads to your side or genital area.    Contact your healthcare provider if:     You have back pain that does not get better with rest and pain medicine.      You have a fever.      You have pain that worsens when you are on your back or when you rest.      You have pain that worsens when you cough or sneeze.      You lose weight without trying.      You have questions or concerns about your condition or care.    Medicines:     NSAIDs help decrease swelling and pain. This medicine is available with or without a doctor's order. NSAIDs can cause stomach bleeding or kidney problems in certain people. If you take blood thinner medicine, always ask your healthcare provider if NSAIDs are safe for you. Always read the medicine label and follow directions.      Acetaminophen decreases pain and fever. It is available without a doctor's order. Ask how much to take and how often to take it. Follow directions. Read the labels of all other medicines you are using to see if they also contain acetaminophen, or ask your doctor or pharmacist. Acetaminophen can cause liver damage if not taken correctly. Do not use more than 4 grams (4,000 milligrams) total of acetaminophen in one day.       Muscle relaxers help decrease muscle spasms and back pain.      Prescription pain medicine may be given. Ask your healthcare provider how to take this medicine safely. Some prescription pain medicines contain acetaminophen. Do not take other medicines that contain acetaminophen without talking to your healthcare provider. Too much acetaminophen may cause liver damage. Prescription pain medicine may cause constipation. Ask your healthcare provider how to prevent or treat constipation.       Take your medicine as directed. Contact your healthcare provider if you think your medicine is not helping or if you have side effects. Tell him or her if you are allergic to any medicine. Keep a list of the medicines, vitamins, and herbs you take. Include the amounts, and when and why you take them. Bring the list or the pill bottles to follow-up visits. Carry your medicine list with you in case of an emergency.    How to manage your back pain:     Apply ice on your back for 15 to 20 minutes every hour or as directed. Use an ice pack, or put crushed ice in a plastic bag. Cover it with a towel before you apply it to your skin. Ice helps prevent tissue damage and decreases pain.      Apply heat on your back for 20 to 30 minutes every 2 hours for as many days as directed. Heat helps decrease pain and muscle spasms.      Stay active as much as you can without causing more pain. Bed rest could make your back pain worse. Avoid heavy lifting until your pain is gone.      Go to physical therapy as directed. A physical therapist can teach you exercises to help improve movement and strength, and to decrease pain.    Follow up with your healthcare provider in 2 weeks, or as directed: Write down your questions so you remember to ask them during your visits.    Patient to be prescribed Keflex 500mg PO BID x 7 days please for recent UTI

## 2023-03-13 NOTE — PHYSICAL THERAPY INITIAL EVALUATION ADULT - MUSCLE TONE ASSESSMENT, REHAB EVAL
pt with postural changes, mild increased thoracic kyphosis ,flattening lumbar lordosis/mildly decreased tone

## 2023-03-13 NOTE — ED PROVIDER NOTE - PROGRESS NOTE DETAILS
Christel Gutierrez for Dr. Cortes:  Per social work, pt was evaluated by case management, pt would like to go to rehab at this time. Per social work, requesting basic labs, COVID swab and PT consult.

## 2023-03-16 ENCOUNTER — APPOINTMENT (OUTPATIENT)
Dept: ORTHOPEDIC SURGERY | Facility: CLINIC | Age: 83
End: 2023-03-16

## 2023-04-14 NOTE — ED ADULT NURSE NOTE - SUICIDE SCREENING QUESTION 3
OUTSIDE FILMS REQUEST - INFORMATION FORM    Patient Name:  Sabi Patel    MRN:  [id number]    Patient YOB: 1962        [place]/Other Name:         The films are being requested for an imaging appointment at [facility] on 4/19.    Name of Facility Exam Requested From: [facility]    Address:     City/State/Zip:      Phone #:        Fax #:  [phone]    Date and Type of films/images to be released:  Breast related        Include Reports:  No      Please send  (  ) CD - Only digital images performed at this site     (  ) Films - Only films (Analog) performed at this site     ( x )  VPN    Send images to:   [facility]     ATTN: Imaging Department     [facility] [place] [address]     (T) [phone]     (F) [phone]     No

## 2023-08-17 NOTE — PHYSICAL THERAPY INITIAL EVALUATION ADULT - GAIT DEVIATIONS NOTED, PT EVAL
[FreeTextEntry1] : Pt comes in follow up ED. Pt feels the cialis is helping with urination and erections.   2/3/23 PSA 0.93 cautious style ,pin with advancement LLE at times/decreased tracy

## 2024-10-16 NOTE — H&P PST ADULT - HEART RATE (BEATS/MIN)
no lesions,  no deformities,  no traumatic injuries,  no significant scars are present,  chest wall non-tender,  no masses present, breathing is unlabored without accessory muscle use,normal breath sounds 100

## 2025-03-24 NOTE — ED ADULT NURSE NOTE - COVID-19 RESULT
[facility]      Return Overactive Bladder (OAB) Note    3/24/2025    Referred by:  No ref. provider found    History of Present Illness    CHIEF COMPLAINT:  Patient presents today for follow up of overactive bladder management    OVERACTIVE BLADDER:  She has experienced a marked decrease in urge incontinence episodes with implementation of timed voiding. She reports to clinic today with her  who is her primary caretaker. He reports having to go from changing undergarments around 7 times daily to once weekly.     MEDICATIONS:  She takes Vesicare (solifenacin) daily and denies experiencing cognitive effects.        A review of 10+ systems was conducted with pertinent positive and negative findings documented in HPI with all other systems reviewed and negative.    Past medical, family, surgical and social history reviewed as documented in chart with pertinent positive medical, family, surgical and social history detailed in HPI    Exam Findings:    Const: no acute distress  Eyes: anicteric, extraocular muscles intact  ENMT: normocephalic, Nl oral membranes  Cardio: no cyanosis, nl cap refill  Pulm: no tachypnea; no resp distress  Abd: soft, no tenderness  Musc: no laceration, no tenderness  Neuro: alert; oriented x 3  Skin: warm, dry; no petichiae  Psych: no anxiety; normal speech       Assessment/Plan:    Assessment & Plan    IMPRESSION:  - Assessed response to Vesicare 10 mg for overactive bladder symptoms.  - Noted improvement in urinary frequency and reduction in urge incontinence episodes.  - Considered adjusting voiding schedule from every 2 hours to every 3 hours based on progress.  - Monitored for potential cognitive side effects of medication.    OVERACTIVE BLADDER AND URINARY URGENCY:  1. Explained importance of adequate hydration and maintaining water intake despite urinary frequency concerns.  2. Patient to attempt extending voiding schedule from every 2 hours to every 3 hours.  3.  Continued solifenacin (Vesicare) 10 mg daily.  4. Discussed potential link between sudden symptom worsening and urinary tract infections in women.  5. Contact the office if symptoms suddenly worsen before scheduled follow-up, as this could indicate a urinary tract infection.    FOLLOW-UP:  1. Follow up in 6 months.  2. If doing well at 6-month visit, may extend to annual follow-ups.  3. Office will call and send a letter to schedule the 6-month follow-up visit.       I spent a total of 30 minutes on the day of the visit.This includes face to face time and non-face to face time preparing to see the patient (eg, review of tests), obtaining and/or reviewing separately obtained history, documenting clinical information in the electronic or other health record, independently interpreting results and communicating results to the patient/family/caregiver, or care coordinator.     Visit complexity today is associated with medical care services that are part of the ongoing care related to the single serious and/or complex condition of Overactive bladder (OAB) and Urgency urinary incontinence (UUI). A longitudinal relationship exists or is being developed between the patient and this practitioner for the care of this condition.    NEGATIVE

## 2025-06-25 NOTE — H&P PST ADULT - NSWEIGHTCALCTOOLDRUG_GEN_A_CORE
This pt's paperwork has been forward to [facility] with all orders and details labs were attached and faxed to [phone].     used